# Patient Record
Sex: MALE | Race: WHITE | Employment: OTHER | URBAN - METROPOLITAN AREA
[De-identification: names, ages, dates, MRNs, and addresses within clinical notes are randomized per-mention and may not be internally consistent; named-entity substitution may affect disease eponyms.]

---

## 2017-01-23 PROBLEM — F10.90 HEAVY ALCOHOL USE: Status: ACTIVE | Noted: 2017-01-23

## 2017-01-23 PROBLEM — Z72.89 HEAVY ALCOHOL USE: Status: ACTIVE | Noted: 2017-01-23

## 2017-01-23 PROBLEM — Z78.9 HEAVY ALCOHOL USE: Status: ACTIVE | Noted: 2017-01-23

## 2017-05-26 PROBLEM — L84 CALLUS OF FOOT: Status: ACTIVE | Noted: 2017-05-26

## 2019-05-20 PROBLEM — N40.1 BENIGN NON-NODULAR PROSTATIC HYPERPLASIA WITH LOWER URINARY TRACT SYMPTOMS: Status: ACTIVE | Noted: 2019-05-20

## 2019-05-20 PROBLEM — K21.9 GASTROESOPHAGEAL REFLUX DISEASE, ESOPHAGITIS PRESENCE NOT SPECIFIED: Status: ACTIVE | Noted: 2019-05-20

## 2019-05-20 PROBLEM — Z87.891 PERSONAL HISTORY OF NICOTINE DEPENDENCE: Status: ACTIVE | Noted: 2019-05-20

## 2019-08-08 ENCOUNTER — APPOINTMENT (OUTPATIENT)
Dept: GENERAL RADIOLOGY | Facility: HOSPITAL | Age: 79
End: 2019-08-08
Attending: EMERGENCY MEDICINE
Payer: MEDICARE

## 2019-08-08 ENCOUNTER — HOSPITAL ENCOUNTER (EMERGENCY)
Facility: HOSPITAL | Age: 79
Discharge: HOME | End: 2019-08-08
Attending: EMERGENCY MEDICINE
Payer: MEDICARE

## 2019-08-08 VITALS
SYSTOLIC BLOOD PRESSURE: 129 MMHG | DIASTOLIC BLOOD PRESSURE: 88 MMHG | WEIGHT: 167 LBS | TEMPERATURE: 99 F | OXYGEN SATURATION: 94 % | RESPIRATION RATE: 12 BRPM | BODY MASS INDEX: 26 KG/M2 | HEART RATE: 77 BPM

## 2019-08-08 DIAGNOSIS — M25.512 ACUTE PAIN OF LEFT SHOULDER: Primary | ICD-10-CM

## 2019-08-08 LAB
ALBUMIN SERPL-MCNC: 3.2 G/DL (ref 3.4–5)
ALBUMIN/GLOB SERPL: 0.9 {RATIO} (ref 1–2)
ALP LIVER SERPL-CCNC: 91 U/L (ref 45–117)
ALT SERPL-CCNC: 18 U/L (ref 16–61)
ANION GAP SERPL CALC-SCNC: 8 MMOL/L (ref 0–18)
AST SERPL-CCNC: 21 U/L (ref 15–37)
BASOPHILS # BLD AUTO: 0.04 X10(3) UL (ref 0–0.2)
BASOPHILS NFR BLD AUTO: 0.9 %
BILIRUB SERPL-MCNC: 0.8 MG/DL (ref 0.1–2)
BUN BLD-MCNC: 12 MG/DL (ref 7–18)
BUN/CREAT SERPL: 12.8 (ref 10–20)
CALCIUM BLD-MCNC: 9 MG/DL (ref 8.5–10.1)
CHLORIDE SERPL-SCNC: 105 MMOL/L (ref 98–112)
CO2 SERPL-SCNC: 24 MMOL/L (ref 21–32)
CREAT BLD-MCNC: 0.94 MG/DL (ref 0.7–1.3)
DEPRECATED RDW RBC AUTO: 42.9 FL (ref 35.1–46.3)
EOSINOPHIL # BLD AUTO: 0.16 X10(3) UL (ref 0–0.7)
EOSINOPHIL NFR BLD AUTO: 3.4 %
ERYTHROCYTE [DISTWIDTH] IN BLOOD BY AUTOMATED COUNT: 12.5 % (ref 11–15)
GLOBULIN PLAS-MCNC: 3.4 G/DL (ref 2.8–4.4)
GLUCOSE BLD-MCNC: 159 MG/DL (ref 70–99)
HCT VFR BLD AUTO: 43.9 % (ref 39–53)
HGB BLD-MCNC: 15.5 G/DL (ref 13–17.5)
IMM GRANULOCYTES # BLD AUTO: 0.05 X10(3) UL (ref 0–1)
IMM GRANULOCYTES NFR BLD: 1.1 %
LYMPHOCYTES # BLD AUTO: 1.02 X10(3) UL (ref 1–4)
LYMPHOCYTES NFR BLD AUTO: 21.8 %
M PROTEIN MFR SERPL ELPH: 6.6 G/DL (ref 6.4–8.2)
MCH RBC QN AUTO: 33.5 PG (ref 26–34)
MCHC RBC AUTO-ENTMCNC: 35.3 G/DL (ref 31–37)
MCV RBC AUTO: 95 FL (ref 80–100)
MONOCYTES # BLD AUTO: 0.28 X10(3) UL (ref 0.1–1)
MONOCYTES NFR BLD AUTO: 6 %
NEUTROPHILS # BLD AUTO: 3.13 X10 (3) UL (ref 1.5–7.7)
NEUTROPHILS # BLD AUTO: 3.13 X10(3) UL (ref 1.5–7.7)
NEUTROPHILS NFR BLD AUTO: 66.8 %
OSMOLALITY SERPL CALC.SUM OF ELEC: 287 MOSM/KG (ref 275–295)
PLATELET # BLD AUTO: 187 10(3)UL (ref 150–450)
POTASSIUM SERPL-SCNC: 3.3 MMOL/L (ref 3.5–5.1)
RBC # BLD AUTO: 4.62 X10(6)UL (ref 3.8–5.8)
SODIUM SERPL-SCNC: 137 MMOL/L (ref 136–145)
TROPONIN I SERPL-MCNC: <0.045 NG/ML (ref ?–0.04)
WBC # BLD AUTO: 4.7 X10(3) UL (ref 4–11)

## 2019-08-08 PROCEDURE — 99285 EMERGENCY DEPT VISIT HI MDM: CPT

## 2019-08-08 PROCEDURE — 85025 COMPLETE CBC W/AUTO DIFF WBC: CPT | Performed by: EMERGENCY MEDICINE

## 2019-08-08 PROCEDURE — 80053 COMPREHEN METABOLIC PANEL: CPT | Performed by: EMERGENCY MEDICINE

## 2019-08-08 PROCEDURE — 93005 ELECTROCARDIOGRAM TRACING: CPT

## 2019-08-08 PROCEDURE — 73030 X-RAY EXAM OF SHOULDER: CPT | Performed by: EMERGENCY MEDICINE

## 2019-08-08 PROCEDURE — 99284 EMERGENCY DEPT VISIT MOD MDM: CPT

## 2019-08-08 PROCEDURE — 84484 ASSAY OF TROPONIN QUANT: CPT | Performed by: EMERGENCY MEDICINE

## 2019-08-08 PROCEDURE — 93010 ELECTROCARDIOGRAM REPORT: CPT

## 2019-08-08 PROCEDURE — 36415 COLL VENOUS BLD VENIPUNCTURE: CPT

## 2019-08-08 RX ORDER — IBUPROFEN 600 MG/1
600 TABLET ORAL ONCE
Status: COMPLETED | OUTPATIENT
Start: 2019-08-08 | End: 2019-08-08

## 2019-08-08 NOTE — ED PROVIDER NOTES
Patient Seen in: [facility] Emergency Department    History   Patient presents with:  Pain (neurologic)    Stated Complaint: pain in left shoulder arm    HPI    Patient is a 61-year-old male, presenting for evaluation of left shoulder pain.     Alicia noted above.     Physical Exam     ED Triage Vitals [08/08/19 0955]   /71   Pulse 97   Resp 21   Temp 98.8 °F (37.1 °C)   Temp src Temporal   SpO2 96 %   O2 Device None (Room air)       Current:/88   Pulse 77   Temp 98.8 °F (37.1 °C) (Temporal) these tests on the individual orders. RAINBOW DRAW BLUE   RAINBOW DRAW LAVENDER   RAINBOW DRAW LIGHT GREEN   RAINBOW DRAW GOLD   CBC W/ DIFFERENTIAL     EKG: The EKG revealed rate, intervals, and axis as noted on the EKG report.  I have reviewed and agree applied as desired. Additional evaluation and intervention may be required, depending on the patient's clinical course. Patient was invited to return for reevaluation of any problems, worsening symptoms, or as discussed.     Patient verbalizes [place]

## 2019-08-08 NOTE — ED INITIAL ASSESSMENT (HPI)
Per patient, left arm pain started 3 hours ago and is dull. Starts in left shoulder and radiates to elbow. Denies chest pain or shortness of breath. Denies cardiac history.  HTN history

## 2019-08-09 LAB
ATRIAL RATE: 88 BPM
P AXIS: 48 DEGREES
P-R INTERVAL: 182 MS
Q-T INTERVAL: 352 MS
QRS DURATION: 78 MS
QTC CALCULATION (BEZET): 425 MS
R AXIS: 1 DEGREES
T AXIS: 45 DEGREES
VENTRICULAR RATE: 88 BPM

## 2020-06-25 PROBLEM — I70.208: Status: ACTIVE | Noted: 2020-06-25

## 2020-06-25 PROBLEM — J43.9 MILD EMPHYSEMA (HCC): Status: ACTIVE | Noted: 2020-06-25

## 2020-06-28 PROBLEM — L84 CALLUS OF FOOT: Status: RESOLVED | Noted: 2017-05-26 | Resolved: 2020-06-28

## 2020-06-29 PROBLEM — N52.9 VASCULOGENIC ERECTILE DYSFUNCTION, UNSPECIFIED VASCULOGENIC ERECTILE DYSFUNCTION TYPE: Status: ACTIVE | Noted: 2020-06-29

## 2020-06-29 PROBLEM — R14.0 ABDOMINAL DISTENSION: Status: ACTIVE | Noted: 2020-06-29

## 2020-06-29 PROBLEM — G25.0 ESSENTIAL TREMOR: Status: ACTIVE | Noted: 2020-06-29

## 2021-01-28 PROBLEM — K21.9 GASTROESOPHAGEAL REFLUX DISEASE: Status: ACTIVE | Noted: 2019-05-20

## 2021-01-29 PROBLEM — J43.1 PANLOBULAR EMPHYSEMA (HCC): Status: ACTIVE | Noted: 2020-06-25

## 2021-03-19 ENCOUNTER — HOSPITAL ENCOUNTER (EMERGENCY)
Facility: HOSPITAL | Age: 81
Discharge: LEFT BEFORE DISCHARGE | End: 2021-03-19
Attending: EMERGENCY MEDICINE
Payer: MEDICARE

## 2021-03-19 VITALS
BODY MASS INDEX: 26.2 KG/M2 | HEART RATE: 76 BPM | WEIGHT: 163 LBS | RESPIRATION RATE: 20 BRPM | SYSTOLIC BLOOD PRESSURE: 166 MMHG | TEMPERATURE: 98 F | DIASTOLIC BLOOD PRESSURE: 96 MMHG | HEIGHT: 66 IN | OXYGEN SATURATION: 96 %

## 2021-03-19 DIAGNOSIS — H54.62 VISION LOSS OF LEFT EYE: Primary | ICD-10-CM

## 2021-03-19 LAB
ALBUMIN SERPL-MCNC: 3.6 G/DL (ref 3.4–5)
ALBUMIN/GLOB SERPL: 1.1 {RATIO} (ref 1–2)
ALP LIVER SERPL-CCNC: 83 U/L
ALT SERPL-CCNC: 21 U/L
ANION GAP SERPL CALC-SCNC: 4 MMOL/L (ref 0–18)
APTT PPP: 29.2 SECONDS (ref 25.4–36.1)
AST SERPL-CCNC: 13 U/L (ref 15–37)
BASOPHILS # BLD AUTO: 0.03 X10(3) UL (ref 0–0.2)
BASOPHILS NFR BLD AUTO: 0.4 %
BILIRUB SERPL-MCNC: 1.1 MG/DL (ref 0.1–2)
BUN BLD-MCNC: 21 MG/DL (ref 7–18)
BUN/CREAT SERPL: 20.6 (ref 10–20)
CALCIUM BLD-MCNC: 9.1 MG/DL (ref 8.5–10.1)
CHLORIDE SERPL-SCNC: 105 MMOL/L (ref 98–112)
CO2 SERPL-SCNC: 27 MMOL/L (ref 21–32)
CREAT BLD-MCNC: 1.02 MG/DL
CRP SERPL-MCNC: 1.2 MG/DL (ref ?–0.3)
DEPRECATED RDW RBC AUTO: 44.9 FL (ref 35.1–46.3)
EOSINOPHIL # BLD AUTO: 0.17 X10(3) UL (ref 0–0.7)
EOSINOPHIL NFR BLD AUTO: 2.4 %
ERYTHROCYTE [DISTWIDTH] IN BLOOD BY AUTOMATED COUNT: 12.5 % (ref 11–15)
GLOBULIN PLAS-MCNC: 3.3 G/DL (ref 2.8–4.4)
GLUCOSE BLD-MCNC: 96 MG/DL (ref 70–99)
HCT VFR BLD AUTO: 39.6 %
HGB BLD-MCNC: 13.7 G/DL
IMM GRANULOCYTES # BLD AUTO: 0.05 X10(3) UL (ref 0–1)
IMM GRANULOCYTES NFR BLD: 0.7 %
INR BLD: 0.99 (ref 0.89–1.11)
LYMPHOCYTES # BLD AUTO: 1.28 X10(3) UL (ref 1–4)
LYMPHOCYTES NFR BLD AUTO: 18 %
M PROTEIN MFR SERPL ELPH: 6.9 G/DL (ref 6.4–8.2)
MCH RBC QN AUTO: 33.5 PG (ref 26–34)
MCHC RBC AUTO-ENTMCNC: 34.6 G/DL (ref 31–37)
MCV RBC AUTO: 96.8 FL
MONOCYTES # BLD AUTO: 0.53 X10(3) UL (ref 0.1–1)
MONOCYTES NFR BLD AUTO: 7.5 %
NEUTROPHILS # BLD AUTO: 5.05 X10 (3) UL (ref 1.5–7.7)
NEUTROPHILS # BLD AUTO: 5.05 X10(3) UL (ref 1.5–7.7)
NEUTROPHILS NFR BLD AUTO: 71 %
OSMOLALITY SERPL CALC.SUM OF ELEC: 285 MOSM/KG (ref 275–295)
PLATELET # BLD AUTO: 214 10(3)UL (ref 150–450)
POTASSIUM SERPL-SCNC: 3.8 MMOL/L (ref 3.5–5.1)
PSA SERPL DL<=0.01 NG/ML-MCNC: 13.4 SECONDS (ref 12.4–14.6)
RBC # BLD AUTO: 4.09 X10(6)UL
SED RATE-ML: 20 MM/HR
SODIUM SERPL-SCNC: 136 MMOL/L (ref 136–145)
WBC # BLD AUTO: 7.1 X10(3) UL (ref 4–11)

## 2021-03-19 PROCEDURE — 80053 COMPREHEN METABOLIC PANEL: CPT | Performed by: EMERGENCY MEDICINE

## 2021-03-19 PROCEDURE — 85730 THROMBOPLASTIN TIME PARTIAL: CPT | Performed by: EMERGENCY MEDICINE

## 2021-03-19 PROCEDURE — 93010 ELECTROCARDIOGRAM REPORT: CPT

## 2021-03-19 PROCEDURE — 99283 EMERGENCY DEPT VISIT LOW MDM: CPT

## 2021-03-19 PROCEDURE — 86140 C-REACTIVE PROTEIN: CPT | Performed by: EMERGENCY MEDICINE

## 2021-03-19 PROCEDURE — 85610 PROTHROMBIN TIME: CPT | Performed by: EMERGENCY MEDICINE

## 2021-03-19 PROCEDURE — 99284 EMERGENCY DEPT VISIT MOD MDM: CPT

## 2021-03-19 PROCEDURE — 85025 COMPLETE CBC W/AUTO DIFF WBC: CPT | Performed by: EMERGENCY MEDICINE

## 2021-03-19 PROCEDURE — 85652 RBC SED RATE AUTOMATED: CPT | Performed by: EMERGENCY MEDICINE

## 2021-03-19 PROCEDURE — 36415 COLL VENOUS BLD VENIPUNCTURE: CPT

## 2021-03-19 PROCEDURE — 93005 ELECTROCARDIOGRAM TRACING: CPT
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
## 2021-03-21 LAB
ATRIAL RATE: 77 BPM
P AXIS: 46 DEGREES
P-R INTERVAL: 202 MS
Q-T INTERVAL: 370 MS
QRS DURATION: 78 MS
QTC CALCULATION (BEZET): 418 MS
R AXIS: -4 DEGREES
T AXIS: 36 DEGREES
VENTRICULAR RATE: 77 BPM

## 2021-04-07 PROBLEM — I65.23 ATHEROSCLEROSIS OF BOTH CAROTID ARTERIES: Status: ACTIVE | Noted: 2021-04-07

## 2021-04-07 PROBLEM — H53.122 TRANSIENT VISUAL LOSS OF LEFT EYE: Status: ACTIVE | Noted: 2021-04-07

## 2021-05-10 PROBLEM — G31.9 CEREBELLAR ATROPHY (HCC): Status: ACTIVE | Noted: 2021-05-10

## 2021-05-10 PROBLEM — G31.9 CEREBRAL ATROPHY (HCC): Status: ACTIVE | Noted: 2021-05-10

## 2021-07-26 PROBLEM — D69.2 PURPURA (HCC): Status: ACTIVE | Noted: 2021-07-26

## 2021-07-26 PROBLEM — G31.9 BRAIN ATROPHY (HCC): Status: ACTIVE | Noted: 2021-07-26

## 2021-07-28 PROBLEM — R14.0 ABDOMINAL DISTENSION: Status: RESOLVED | Noted: 2020-06-29 | Resolved: 2021-07-28

## 2022-01-01 ENCOUNTER — NURSE ONLY (OUTPATIENT)
Dept: LAB | Facility: HOSPITAL | Age: 82
End: 2022-01-01
Attending: FAMILY MEDICINE
Payer: MEDICARE

## 2022-01-01 DIAGNOSIS — J94.8 PLEURAL MASS: ICD-10-CM

## 2022-01-01 LAB
ERYTHROCYTE [DISTWIDTH] IN BLOOD BY AUTOMATED COUNT: 15.1 %
HCT VFR BLD AUTO: 31.2 %
HGB BLD-MCNC: 9.4 G/DL
INR BLD: 1.08 (ref 0.85–1.16)
MCH RBC QN AUTO: 26.4 PG (ref 26–34)
MCHC RBC AUTO-ENTMCNC: 30.1 G/DL (ref 31–37)
MCV RBC AUTO: 87.6 FL
PLATELET # BLD AUTO: 193 10(3)UL (ref 150–450)
PROTHROMBIN TIME: 14 SECONDS (ref 11.6–14.8)
RBC # BLD AUTO: 3.56 X10(6)UL
WBC # BLD AUTO: 5.6 X10(3) UL (ref 4–11)

## 2022-01-01 PROCEDURE — 85027 COMPLETE CBC AUTOMATED: CPT

## 2022-01-01 PROCEDURE — 36415 COLL VENOUS BLD VENIPUNCTURE: CPT

## 2022-01-01 PROCEDURE — 85610 PROTHROMBIN TIME: CPT

## 2022-01-01 PROCEDURE — 88360 TUMOR IMMUNOHISTOCHEM/MANUAL: CPT

## 2022-06-29 ENCOUNTER — APPOINTMENT (OUTPATIENT)
Dept: CT IMAGING | Facility: HOSPITAL | Age: 82
End: 2022-06-29
Attending: EMERGENCY MEDICINE
Payer: MEDICARE

## 2022-06-29 ENCOUNTER — HOSPITAL ENCOUNTER (OUTPATIENT)
Facility: HOSPITAL | Age: 82
Setting detail: OBSERVATION
Discharge: HOME | End: 2022-06-30
Attending: EMERGENCY MEDICINE | Admitting: INTERNAL MEDICINE
Payer: MEDICARE

## 2022-06-29 ENCOUNTER — APPOINTMENT (OUTPATIENT)
Dept: GENERAL RADIOLOGY | Facility: HOSPITAL | Age: 82
End: 2022-06-29
Attending: EMERGENCY MEDICINE
Payer: MEDICARE

## 2022-06-29 DIAGNOSIS — R55 SYNCOPE, NEAR: Primary | ICD-10-CM

## 2022-06-29 DIAGNOSIS — R79.89 ELEVATED D-DIMER: ICD-10-CM

## 2022-06-29 DIAGNOSIS — I95.1 ORTHOSTASIS: ICD-10-CM

## 2022-06-29 DIAGNOSIS — E87.6 HYPOKALEMIA: ICD-10-CM

## 2022-06-29 LAB
ALBUMIN SERPL-MCNC: 3.1 G/DL (ref 3.4–5)
ALBUMIN/GLOB SERPL: 0.9 {RATIO} (ref 1–2)
ALP LIVER SERPL-CCNC: 117 U/L
ALT SERPL-CCNC: 24 U/L
ANION GAP SERPL CALC-SCNC: 10 MMOL/L (ref 0–18)
AST SERPL-CCNC: 17 U/L (ref 15–37)
BASOPHILS # BLD AUTO: 0.02 X10(3) UL (ref 0–0.2)
BASOPHILS NFR BLD AUTO: 0.4 %
BILIRUB SERPL-MCNC: 1.1 MG/DL (ref 0.1–2)
BUN BLD-MCNC: 25 MG/DL (ref 7–18)
CALCIUM BLD-MCNC: 9.1 MG/DL (ref 8.5–10.1)
CHLORIDE SERPL-SCNC: 100 MMOL/L (ref 98–112)
CO2 SERPL-SCNC: 26 MMOL/L (ref 21–32)
CREAT BLD-MCNC: 1.07 MG/DL
D DIMER PPP FEU-MCNC: 1.84 UG/ML FEU (ref ?–0.79)
EOSINOPHIL # BLD AUTO: 0.08 X10(3) UL (ref 0–0.7)
EOSINOPHIL NFR BLD AUTO: 1.7 %
ERYTHROCYTE [DISTWIDTH] IN BLOOD BY AUTOMATED COUNT: 12.8 %
GLOBULIN PLAS-MCNC: 3.4 G/DL (ref 2.8–4.4)
GLUCOSE BLD-MCNC: 96 MG/DL (ref 70–99)
HCT VFR BLD AUTO: 35.9 %
HGB BLD-MCNC: 12.6 G/DL
IMM GRANULOCYTES # BLD AUTO: 0.02 X10(3) UL (ref 0–1)
IMM GRANULOCYTES NFR BLD: 0.4 %
LYMPHOCYTES # BLD AUTO: 1.12 X10(3) UL (ref 1–4)
LYMPHOCYTES NFR BLD AUTO: 24.4 %
MAGNESIUM SERPL-MCNC: 1.7 MG/DL (ref 1.6–2.6)
MCH RBC QN AUTO: 32.5 PG (ref 26–34)
MCHC RBC AUTO-ENTMCNC: 35.1 G/DL (ref 31–37)
MCV RBC AUTO: 92.5 FL
MONOCYTES # BLD AUTO: 0.52 X10(3) UL (ref 0.1–1)
MONOCYTES NFR BLD AUTO: 11.3 %
NEUTROPHILS # BLD AUTO: 2.83 X10 (3) UL (ref 1.5–7.7)
NEUTROPHILS # BLD AUTO: 2.83 X10(3) UL (ref 1.5–7.7)
NEUTROPHILS NFR BLD AUTO: 61.8 %
NT-PROBNP SERPL-MCNC: 80 PG/ML (ref ?–450)
OSMOLALITY SERPL CALC.SUM OF ELEC: 286 MOSM/KG (ref 275–295)
PLATELET # BLD AUTO: 174 10(3)UL (ref 150–450)
POTASSIUM SERPL-SCNC: 3 MMOL/L (ref 3.5–5.1)
PROT SERPL-MCNC: 6.5 G/DL (ref 6.4–8.2)
RBC # BLD AUTO: 3.88 X10(6)UL
SARS-COV-2 RNA RESP QL NAA+PROBE: NOT DETECTED
SODIUM SERPL-SCNC: 136 MMOL/L (ref 136–145)
TROPONIN I HIGH SENSITIVITY: 10 NG/L
TSI SER-ACNC: 2.72 MIU/ML (ref 0.36–3.74)
WBC # BLD AUTO: 4.6 X10(3) UL (ref 4–11)

## 2022-06-29 PROCEDURE — 96360 HYDRATION IV INFUSION INIT: CPT

## 2022-06-29 PROCEDURE — 84484 ASSAY OF TROPONIN QUANT: CPT | Performed by: EMERGENCY MEDICINE

## 2022-06-29 PROCEDURE — 93010 ELECTROCARDIOGRAM REPORT: CPT

## 2022-06-29 PROCEDURE — 99285 EMERGENCY DEPT VISIT HI MDM: CPT

## 2022-06-29 PROCEDURE — 96361 HYDRATE IV INFUSION ADD-ON: CPT

## 2022-06-29 PROCEDURE — 71045 X-RAY EXAM CHEST 1 VIEW: CPT | Performed by: EMERGENCY MEDICINE

## 2022-06-29 PROCEDURE — 71275 CT ANGIOGRAPHY CHEST: CPT | Performed by: EMERGENCY MEDICINE

## 2022-06-29 PROCEDURE — 80053 COMPREHEN METABOLIC PANEL: CPT | Performed by: EMERGENCY MEDICINE

## 2022-06-29 PROCEDURE — 93005 ELECTROCARDIOGRAM TRACING: CPT

## 2022-06-29 PROCEDURE — 70450 CT HEAD/BRAIN W/O DYE: CPT | Performed by: EMERGENCY MEDICINE

## 2022-06-29 PROCEDURE — 83880 ASSAY OF NATRIURETIC PEPTIDE: CPT | Performed by: EMERGENCY MEDICINE

## 2022-06-29 PROCEDURE — 83735 ASSAY OF MAGNESIUM: CPT | Performed by: EMERGENCY MEDICINE

## 2022-06-29 PROCEDURE — 84443 ASSAY THYROID STIM HORMONE: CPT | Performed by: EMERGENCY MEDICINE

## 2022-06-29 PROCEDURE — 85025 COMPLETE CBC W/AUTO DIFF WBC: CPT | Performed by: EMERGENCY MEDICINE

## 2022-06-29 PROCEDURE — 85379 FIBRIN DEGRADATION QUANT: CPT | Performed by: EMERGENCY MEDICINE

## 2022-06-29 RX ORDER — IOHEXOL 350 MG/ML
100 INJECTION, SOLUTION INTRAVENOUS
Status: COMPLETED | OUTPATIENT
Start: 2022-06-29 | End: 2022-06-29

## 2022-06-29 RX ORDER — POTASSIUM CHLORIDE 1.5 G/1.77G
40 POWDER, FOR SOLUTION ORAL ONCE
Status: COMPLETED | OUTPATIENT
Start: 2022-06-29 | End: 2022-06-29

## 2022-06-30 VITALS
OXYGEN SATURATION: 97 % | DIASTOLIC BLOOD PRESSURE: 56 MMHG | SYSTOLIC BLOOD PRESSURE: 128 MMHG | HEART RATE: 101 BPM | WEIGHT: 160.94 LBS | BODY MASS INDEX: 26 KG/M2 | TEMPERATURE: 98 F | RESPIRATION RATE: 18 BRPM

## 2022-06-30 PROBLEM — R79.89 ELEVATED D-DIMER: Status: ACTIVE | Noted: 2022-01-01

## 2022-06-30 PROBLEM — I95.1 ORTHOSTASIS: Status: ACTIVE | Noted: 2022-06-30

## 2022-06-30 PROBLEM — E87.6 HYPOKALEMIA: Status: ACTIVE | Noted: 2022-06-30

## 2022-06-30 PROBLEM — E87.6 HYPOKALEMIA: Status: ACTIVE | Noted: 2022-01-01

## 2022-06-30 PROBLEM — I95.1 ORTHOSTASIS: Status: ACTIVE | Noted: 2022-01-01

## 2022-06-30 PROBLEM — R79.89 ELEVATED D-DIMER: Status: ACTIVE | Noted: 2022-06-30

## 2022-06-30 LAB
ATRIAL RATE: 98 BPM
BILIRUB UR QL STRIP.AUTO: NEGATIVE
CLARITY UR REFRACT.AUTO: CLEAR
GLUCOSE UR STRIP.AUTO-MCNC: NEGATIVE MG/DL
KETONES UR STRIP.AUTO-MCNC: NEGATIVE MG/DL
NITRITE UR QL STRIP.AUTO: NEGATIVE
PH UR STRIP.AUTO: 7 [PH] (ref 5–8)
PROT UR STRIP.AUTO-MCNC: NEGATIVE MG/DL
Q-T INTERVAL: 346 MS
QRS DURATION: 84 MS
QTC CALCULATION (BEZET): 430 MS
R AXIS: -18 DEGREES
RBC UR QL AUTO: NEGATIVE
SP GR UR STRIP.AUTO: >1.03 (ref 1–1.03)
T AXIS: 41 DEGREES
UROBILINOGEN UR STRIP.AUTO-MCNC: 2 MG/DL
VENTRICULAR RATE: 93 BPM

## 2022-06-30 PROCEDURE — 87086 URINE CULTURE/COLONY COUNT: CPT | Performed by: EMERGENCY MEDICINE

## 2022-06-30 PROCEDURE — 96372 THER/PROPH/DIAG INJ SC/IM: CPT

## 2022-06-30 PROCEDURE — 81001 URINALYSIS AUTO W/SCOPE: CPT | Performed by: EMERGENCY MEDICINE

## 2022-06-30 RX ORDER — AMITRIPTYLINE HYDROCHLORIDE 10 MG/1
10 TABLET, FILM COATED ORAL NIGHTLY PRN
Status: DISCONTINUED | OUTPATIENT
Start: 2022-06-30 | End: 2022-06-30

## 2022-06-30 RX ORDER — ACETAMINOPHEN 500 MG
500 TABLET ORAL EVERY 4 HOURS PRN
Status: DISCONTINUED | OUTPATIENT
Start: 2022-06-30 | End: 2022-06-30

## 2022-06-30 RX ORDER — CLOPIDOGREL BISULFATE 75 MG/1
75 TABLET ORAL DAILY
Status: DISCONTINUED | OUTPATIENT
Start: 2022-06-30 | End: 2022-06-30

## 2022-06-30 RX ORDER — HEPARIN SODIUM 5000 [USP'U]/ML
5000 INJECTION, SOLUTION INTRAVENOUS; SUBCUTANEOUS EVERY 8 HOURS SCHEDULED
Status: DISCONTINUED | OUTPATIENT
Start: 2022-06-30 | End: 2022-06-30

## 2022-06-30 RX ORDER — FINASTERIDE 5 MG/1
5 TABLET, FILM COATED ORAL DAILY
Status: DISCONTINUED | OUTPATIENT
Start: 2022-06-30 | End: 2022-06-30

## 2022-06-30 RX ORDER — VALSARTAN AND HYDROCHLOROTHIAZIDE 320; 25 MG/1; MG/1
1 TABLET, FILM COATED ORAL DAILY
Status: DISCONTINUED | OUTPATIENT
Start: 2022-06-30 | End: 2022-06-30 | Stop reason: SDUPTHER

## 2022-06-30 RX ORDER — AMITRIPTYLINE HYDROCHLORIDE 10 MG/1
TABLET, FILM COATED ORAL NIGHTLY PRN
COMMUNITY
Start: 2022-06-13

## 2022-06-30 RX ORDER — TAMSULOSIN HYDROCHLORIDE 0.4 MG/1
0.4 CAPSULE ORAL
Status: DISCONTINUED | OUTPATIENT
Start: 2022-06-30 | End: 2022-06-30

## 2022-06-30 RX ORDER — AMLODIPINE BESYLATE 5 MG/1
5 TABLET ORAL DAILY
Qty: 30 TABLET | Refills: 0 | Status: SHIPPED | OUTPATIENT
Start: 2022-07-01

## 2022-06-30 RX ORDER — AMLODIPINE BESYLATE 5 MG/1
5 TABLET ORAL DAILY
Status: DISCONTINUED | OUTPATIENT
Start: 2022-06-30 | End: 2022-06-30

## 2022-06-30 RX ORDER — ROSUVASTATIN CALCIUM 10 MG/1
10 TABLET, COATED ORAL NIGHTLY
Status: DISCONTINUED | OUTPATIENT
Start: 2022-06-30 | End: 2022-06-30

## 2022-06-30 RX ORDER — PANTOPRAZOLE SODIUM 40 MG/1
40 TABLET, DELAYED RELEASE ORAL
Status: DISCONTINUED | OUTPATIENT
Start: 2022-06-30 | End: 2022-06-30

## 2022-06-30 NOTE — PLAN OF CARE
Assumed care at around 0100  A&Ox4, RA, NSR on tele  Denying pain   Call light within reach    Patient updated on plan of care

## 2022-06-30 NOTE — ED QUICK NOTES
Orders for admission, patient is aware of plan and ready to go upstairs. Any questions, please call ED RN Galilea Laguans  at extension [phone]. Vaccinated?  Yes  Type of COVID test sent: Rapid PCR  COVID Suspicion level: Low      Titratable drug(s) infusing:N/A  Rate:    LOC at time of transport:AXOX4    Other pertinent information:    CIWA score=n/a  NIH score=n/a

## 2022-06-30 NOTE — ED INITIAL ASSESSMENT (HPI)
Pt reports feeling dizzy and fell twice today. Pt denies any LOC or injuries. Pt has a hx of arrhythmias and was recently placed on a heart monitor for 7 days.

## 2022-06-30 NOTE — PROGRESS NOTES
06/30/22 0120 06/30/22 0123 06/30/22 0126   Vital Signs   /76 132/70 132/64   MAP (mmHg) 92 85 81   BP Location Right arm Right arm Right arm   BP Method Automatic Automatic Automatic   Patient Position Lying Sitting Standing

## 2022-06-30 NOTE — PLAN OF CARE
Assumed care at 0700  Patient alert, oriented x4  Orthostatics completed  Ambulating in halls without difficulty  Denies dizziness, sob  Consults cleared for discharge    AVS reviewed with patient and spouse. All questions answered. NURSING DISCHARGE NOTE    Discharged Home via Wheelchair. Accompanied by Spouse  Belongings Taken by patient/family.

## 2022-06-30 NOTE — PROGRESS NOTES
06/30/22 1235 06/30/22 1237 06/30/22 1239   Vital Signs   /59 122/62 128/56   MAP (mmHg) 77 76 73   BP Location Right arm Right arm Right arm   BP Method Automatic Automatic Automatic   Patient Position Lying Sitting Standing

## 2022-09-01 ENCOUNTER — APPOINTMENT (OUTPATIENT)
Dept: GENERAL RADIOLOGY | Facility: HOSPITAL | Age: 82
DRG: 853 | End: 2022-09-01
Payer: MEDICARE

## 2022-09-01 ENCOUNTER — APPOINTMENT (OUTPATIENT)
Dept: CT IMAGING | Facility: HOSPITAL | Age: 82
DRG: 853 | End: 2022-09-01
Attending: EMERGENCY MEDICINE
Payer: MEDICARE

## 2022-09-01 ENCOUNTER — APPOINTMENT (OUTPATIENT)
Dept: GENERAL RADIOLOGY | Facility: HOSPITAL | Age: 82
DRG: 853 | End: 2022-09-01
Attending: EMERGENCY MEDICINE
Payer: MEDICARE

## 2022-09-01 ENCOUNTER — HOSPITAL ENCOUNTER (INPATIENT)
Facility: HOSPITAL | Age: 82
LOS: 27 days | Discharge: NURSING, REHAB OR LONG-TERM CARE | DRG: 853 | End: 2022-09-30
Attending: EMERGENCY MEDICINE | Admitting: INTERNAL MEDICINE
Payer: MEDICARE

## 2022-09-01 ENCOUNTER — APPOINTMENT (OUTPATIENT)
Dept: MRI IMAGING | Facility: HOSPITAL | Age: 82
DRG: 853 | End: 2022-09-01
Attending: EMERGENCY MEDICINE
Payer: MEDICARE

## 2022-09-01 DIAGNOSIS — E87.6 HYPOKALEMIA: Primary | ICD-10-CM

## 2022-09-01 DIAGNOSIS — R53.1 WEAKNESS GENERALIZED: ICD-10-CM

## 2022-09-01 DIAGNOSIS — R29.6 FREQUENT FALLS: ICD-10-CM

## 2022-09-01 DIAGNOSIS — S32.000A COMPRESSION FRACTURE OF LUMBAR VERTEBRA, UNSPECIFIED LUMBAR VERTEBRAL LEVEL, INITIAL ENCOUNTER (HCC): ICD-10-CM

## 2022-09-01 DIAGNOSIS — U07.1 COVID-19 VIRUS INFECTION: ICD-10-CM

## 2022-09-01 LAB
ALBUMIN SERPL-MCNC: 2.9 G/DL (ref 3.4–5)
ALBUMIN/GLOB SERPL: 0.7 {RATIO} (ref 1–2)
ALP LIVER SERPL-CCNC: 114 U/L
ALT SERPL-CCNC: 23 U/L
ANION GAP SERPL CALC-SCNC: 8 MMOL/L (ref 0–18)
APTT PPP: 29.5 SECONDS (ref 23.3–35.6)
AST SERPL-CCNC: 25 U/L (ref 15–37)
BASOPHILS # BLD AUTO: 0.01 X10(3) UL (ref 0–0.2)
BASOPHILS NFR BLD AUTO: 0.2 %
BILIRUB SERPL-MCNC: 1.8 MG/DL (ref 0.1–2)
BILIRUB UR QL STRIP.AUTO: NEGATIVE
BUN BLD-MCNC: 14 MG/DL (ref 7–18)
CALCIUM BLD-MCNC: 9.3 MG/DL (ref 8.5–10.1)
CHLORIDE SERPL-SCNC: 100 MMOL/L (ref 98–112)
CLARITY UR REFRACT.AUTO: CLEAR
CO2 SERPL-SCNC: 28 MMOL/L (ref 21–32)
COLOR UR AUTO: YELLOW
CREAT BLD-MCNC: 0.9 MG/DL
D DIMER PPP FEU-MCNC: 1.66 UG/ML FEU (ref ?–0.79)
EOSINOPHIL # BLD AUTO: 0.01 X10(3) UL (ref 0–0.7)
EOSINOPHIL NFR BLD AUTO: 0.2 %
ERYTHROCYTE [DISTWIDTH] IN BLOOD BY AUTOMATED COUNT: 14.7 %
GFR SERPLBLD BASED ON 1.73 SQ M-ARVRAT: 87 ML/MIN/1.73M2 (ref 60–?)
GLOBULIN PLAS-MCNC: 4 G/DL (ref 2.8–4.4)
GLUCOSE BLD-MCNC: 96 MG/DL (ref 70–99)
GLUCOSE UR STRIP.AUTO-MCNC: NEGATIVE MG/DL
HCT VFR BLD AUTO: 38.6 %
HGB BLD-MCNC: 12.8 G/DL
IMM GRANULOCYTES # BLD AUTO: 0.03 X10(3) UL (ref 0–1)
IMM GRANULOCYTES NFR BLD: 0.6 %
INR BLD: 1.08 (ref 0.85–1.16)
KETONES UR STRIP.AUTO-MCNC: NEGATIVE MG/DL
LYMPHOCYTES # BLD AUTO: 0.57 X10(3) UL (ref 1–4)
LYMPHOCYTES NFR BLD AUTO: 10.6 %
MCH RBC QN AUTO: 32.8 PG (ref 26–34)
MCHC RBC AUTO-ENTMCNC: 33.2 G/DL (ref 31–37)
MCV RBC AUTO: 99 FL
MONOCYTES # BLD AUTO: 0.81 X10(3) UL (ref 0.1–1)
MONOCYTES NFR BLD AUTO: 15.1 %
NEUTROPHILS # BLD AUTO: 3.95 X10 (3) UL (ref 1.5–7.7)
NEUTROPHILS # BLD AUTO: 3.95 X10(3) UL (ref 1.5–7.7)
NEUTROPHILS NFR BLD AUTO: 73.3 %
NITRITE UR QL STRIP.AUTO: NEGATIVE
NT-PROBNP SERPL-MCNC: 911 PG/ML (ref ?–450)
OSMOLALITY SERPL CALC.SUM OF ELEC: 282 MOSM/KG (ref 275–295)
PH UR STRIP.AUTO: 7 [PH] (ref 5–8)
PLATELET # BLD AUTO: 238 10(3)UL (ref 150–450)
POTASSIUM SERPL-SCNC: 2.9 MMOL/L (ref 3.5–5.1)
PROT SERPL-MCNC: 6.9 G/DL (ref 6.4–8.2)
PROT UR STRIP.AUTO-MCNC: NEGATIVE MG/DL
PROTHROMBIN TIME: 14 SECONDS (ref 11.6–14.8)
RBC # BLD AUTO: 3.9 X10(6)UL
RBC UR QL AUTO: NEGATIVE
SARS-COV-2 RNA RESP QL NAA+PROBE: DETECTED
SODIUM SERPL-SCNC: 136 MMOL/L (ref 136–145)
SP GR UR STRIP.AUTO: 1.01 (ref 1–1.03)
TROPONIN I HIGH SENSITIVITY: 21 NG/L
UROBILINOGEN UR STRIP.AUTO-MCNC: 4 MG/DL
WBC # BLD AUTO: 5.4 X10(3) UL (ref 4–11)

## 2022-09-01 PROCEDURE — 70450 CT HEAD/BRAIN W/O DYE: CPT | Performed by: EMERGENCY MEDICINE

## 2022-09-01 PROCEDURE — 72148 MRI LUMBAR SPINE W/O DYE: CPT | Performed by: EMERGENCY MEDICINE

## 2022-09-01 PROCEDURE — 71045 X-RAY EXAM CHEST 1 VIEW: CPT | Performed by: EMERGENCY MEDICINE

## 2022-09-01 PROCEDURE — 71260 CT THORAX DX C+: CPT | Performed by: EMERGENCY MEDICINE

## 2022-09-01 RX ORDER — TAMSULOSIN HYDROCHLORIDE 0.4 MG/1
0.4 CAPSULE ORAL DAILY
COMMUNITY

## 2022-09-01 RX ORDER — ROSUVASTATIN CALCIUM 10 MG/1
10 TABLET, COATED ORAL NIGHTLY
COMMUNITY
End: 2022-09-30

## 2022-09-01 RX ORDER — ONDANSETRON 2 MG/ML
4 INJECTION INTRAMUSCULAR; INTRAVENOUS EVERY 6 HOURS PRN
Status: DISCONTINUED | OUTPATIENT
Start: 2022-09-01 | End: 2022-09-06

## 2022-09-01 RX ORDER — POTASSIUM CHLORIDE 20 MEQ/1
40 TABLET, EXTENDED RELEASE ORAL ONCE
Status: COMPLETED | OUTPATIENT
Start: 2022-09-01 | End: 2022-09-01

## 2022-09-01 RX ORDER — LORAZEPAM 1 MG/1
0.5 TABLET ORAL ONCE
Status: COMPLETED | OUTPATIENT
Start: 2022-09-01 | End: 2022-09-01

## 2022-09-01 RX ORDER — ENOXAPARIN SODIUM 100 MG/ML
40 INJECTION SUBCUTANEOUS DAILY
Status: DISCONTINUED | OUTPATIENT
Start: 2022-09-02 | End: 2022-09-19 | Stop reason: SDUPTHER

## 2022-09-01 RX ORDER — METOCLOPRAMIDE HYDROCHLORIDE 5 MG/ML
10 INJECTION INTRAMUSCULAR; INTRAVENOUS EVERY 8 HOURS PRN
Status: DISCONTINUED | OUTPATIENT
Start: 2022-09-01 | End: 2022-09-30

## 2022-09-01 RX ORDER — AMITRIPTYLINE HYDROCHLORIDE 10 MG/1
10 TABLET, FILM COATED ORAL NIGHTLY PRN
Status: DISCONTINUED | OUTPATIENT
Start: 2022-09-01 | End: 2022-09-30

## 2022-09-01 RX ORDER — POLYETHYLENE GLYCOL 3350 17 G/17G
17 POWDER, FOR SOLUTION ORAL DAILY PRN
Status: DISCONTINUED | OUTPATIENT
Start: 2022-09-01 | End: 2022-09-14

## 2022-09-01 RX ORDER — ASPIRIN 81 MG/1
81 TABLET ORAL AS NEEDED
COMMUNITY
End: 2022-09-30

## 2022-09-01 RX ORDER — BISACODYL 10 MG
10 SUPPOSITORY, RECTAL RECTAL
Status: DISCONTINUED | OUTPATIENT
Start: 2022-09-01 | End: 2022-09-14

## 2022-09-01 RX ORDER — FINASTERIDE 5 MG/1
5 TABLET, FILM COATED ORAL DAILY
Status: DISCONTINUED | OUTPATIENT
Start: 2022-09-02 | End: 2022-09-21 | Stop reason: SDUPTHER

## 2022-09-01 RX ORDER — CLOPIDOGREL BISULFATE 75 MG/1
75 TABLET ORAL DAILY
Status: DISCONTINUED | OUTPATIENT
Start: 2022-09-02 | End: 2022-09-14 | Stop reason: SDUPTHER

## 2022-09-01 RX ORDER — PANTOPRAZOLE SODIUM 40 MG/1
40 TABLET, DELAYED RELEASE ORAL
Status: DISCONTINUED | OUTPATIENT
Start: 2022-09-02 | End: 2022-09-03

## 2022-09-01 RX ORDER — CLOPIDOGREL BISULFATE 75 MG/1
75 TABLET ORAL DAILY
COMMUNITY

## 2022-09-01 RX ORDER — ACETAMINOPHEN 500 MG
500 TABLET ORAL EVERY 4 HOURS PRN
Status: DISCONTINUED | OUTPATIENT
Start: 2022-09-01 | End: 2022-09-30

## 2022-09-01 RX ORDER — SENNOSIDES 8.6 MG
17.2 TABLET ORAL NIGHTLY PRN
Status: DISCONTINUED | OUTPATIENT
Start: 2022-09-01 | End: 2022-09-14

## 2022-09-01 RX ORDER — TAMSULOSIN HYDROCHLORIDE 0.4 MG/1
0.4 CAPSULE ORAL DAILY
Status: DISCONTINUED | OUTPATIENT
Start: 2022-09-02 | End: 2022-09-30

## 2022-09-01 RX ORDER — IOHEXOL 350 MG/ML
90 INJECTION, SOLUTION INTRAVENOUS
Status: COMPLETED | OUTPATIENT
Start: 2022-09-01 | End: 2022-09-01

## 2022-09-01 RX ORDER — FINASTERIDE 5 MG/1
5 TABLET, FILM COATED ORAL DAILY
COMMUNITY

## 2022-09-01 RX ORDER — ROSUVASTATIN CALCIUM 10 MG/1
10 TABLET, COATED ORAL NIGHTLY
Status: DISCONTINUED | OUTPATIENT
Start: 2022-09-01 | End: 2022-09-09

## 2022-09-01 RX ORDER — VALSARTAN AND HYDROCHLOROTHIAZIDE 320; 25 MG/1; MG/1
1 TABLET, FILM COATED ORAL DAILY
COMMUNITY
End: 2022-09-01 | Stop reason: CLARIF

## 2022-09-01 RX ORDER — SODIUM PHOSPHATE, DIBASIC AND SODIUM PHOSPHATE, MONOBASIC 7; 19 G/133ML; G/133ML
1 ENEMA RECTAL ONCE AS NEEDED
Status: DISCONTINUED | OUTPATIENT
Start: 2022-09-01 | End: 2022-09-30

## 2022-09-01 NOTE — ED QUICK NOTES
Orders for admission, patient is aware of plan and ready to go upstairs. Any questions, please call ED KENTRELL Connellly at extension [phone]     Patient Covid vaccination status: Fully vaccinated     COVID Test Ordered in ED: Rapid SARS-CoV-2 by PCR    COVID Suspicion at Admission: N/A    Running Infusions:      Mental Status/LOC at time of transport: alert and oriented x4, but intermittent moments of confusion with re-explanation needed. Pt is on external nichols catheter at this time. Increase in falls in the last several weeks. Starting using a walker yesterday.      Other pertinent information:   CIWA score: N/A   NIH score:  N/A

## 2022-09-01 NOTE — ED INITIAL ASSESSMENT (HPI)
Pt c/o multiple falls over the last few weeks, last fall was this AM. Pt denies hitting his head, denies LOC. Pt also c/o increased leg weakness, difficulty with balance, severe lower back pain. +thinners.

## 2022-09-02 LAB
ALBUMIN SERPL-MCNC: 2.5 G/DL (ref 3.4–5)
ALBUMIN/GLOB SERPL: 0.7 {RATIO} (ref 1–2)
ALP LIVER SERPL-CCNC: 94 U/L
ALT SERPL-CCNC: 19 U/L
ANION GAP SERPL CALC-SCNC: 9 MMOL/L (ref 0–18)
AST SERPL-CCNC: 24 U/L (ref 15–37)
ATRIAL RATE: 102 BPM
ATRIAL RATE: 106 BPM
BASOPHILS # BLD AUTO: 0.01 X10(3) UL (ref 0–0.2)
BASOPHILS NFR BLD AUTO: 0.2 %
BILIRUB SERPL-MCNC: 1.6 MG/DL (ref 0.1–2)
BUN BLD-MCNC: 9 MG/DL (ref 7–18)
CALCIUM BLD-MCNC: 8.5 MG/DL (ref 8.5–10.1)
CHLORIDE SERPL-SCNC: 101 MMOL/L (ref 98–112)
CO2 SERPL-SCNC: 24 MMOL/L (ref 21–32)
CREAT BLD-MCNC: 0.64 MG/DL
EOSINOPHIL # BLD AUTO: 0 X10(3) UL (ref 0–0.7)
EOSINOPHIL NFR BLD AUTO: 0 %
ERYTHROCYTE [DISTWIDTH] IN BLOOD BY AUTOMATED COUNT: 14.6 %
GFR SERPLBLD BASED ON 1.73 SQ M-ARVRAT: 96 ML/MIN/1.73M2 (ref 60–?)
GLOBULIN PLAS-MCNC: 3.6 G/DL (ref 2.8–4.4)
GLUCOSE BLD-MCNC: 116 MG/DL (ref 70–99)
GLUCOSE BLD-MCNC: 164 MG/DL (ref 70–99)
HCT VFR BLD AUTO: 37.5 %
HGB BLD-MCNC: 12.6 G/DL
IMM GRANULOCYTES # BLD AUTO: 0.03 X10(3) UL (ref 0–1)
IMM GRANULOCYTES NFR BLD: 0.7 %
LYMPHOCYTES # BLD AUTO: 0.6 X10(3) UL (ref 1–4)
LYMPHOCYTES NFR BLD AUTO: 13.1 %
MAGNESIUM SERPL-MCNC: 1.6 MG/DL (ref 1.6–2.6)
MCH RBC QN AUTO: 33.7 PG (ref 26–34)
MCHC RBC AUTO-ENTMCNC: 33.6 G/DL (ref 31–37)
MCV RBC AUTO: 100.3 FL
MONOCYTES # BLD AUTO: 0.52 X10(3) UL (ref 0.1–1)
MONOCYTES NFR BLD AUTO: 11.4 %
NEUTROPHILS # BLD AUTO: 3.42 X10 (3) UL (ref 1.5–7.7)
NEUTROPHILS # BLD AUTO: 3.42 X10(3) UL (ref 1.5–7.7)
NEUTROPHILS NFR BLD AUTO: 74.6 %
OSMOLALITY SERPL CALC.SUM OF ELEC: 278 MOSM/KG (ref 275–295)
P AXIS: 86 DEGREES
P-R INTERVAL: 198 MS
PLATELET # BLD AUTO: 188 10(3)UL (ref 150–450)
POTASSIUM SERPL-SCNC: 3.1 MMOL/L (ref 3.5–5.1)
POTASSIUM SERPL-SCNC: 4 MMOL/L (ref 3.5–5.1)
PROT SERPL-MCNC: 6.1 G/DL (ref 6.4–8.2)
Q-T INTERVAL: 346 MS
Q-T INTERVAL: 366 MS
QRS DURATION: 72 MS
QRS DURATION: 90 MS
QTC CALCULATION (BEZET): 450 MS
QTC CALCULATION (BEZET): 483 MS
R AXIS: -7 DEGREES
R AXIS: 9 DEGREES
RBC # BLD AUTO: 3.74 X10(6)UL
SODIUM SERPL-SCNC: 134 MMOL/L (ref 136–145)
T AXIS: 57 DEGREES
T AXIS: 57 DEGREES
VENTRICULAR RATE: 102 BPM
VENTRICULAR RATE: 105 BPM
WBC # BLD AUTO: 4.6 X10(3) UL (ref 4–11)

## 2022-09-02 PROCEDURE — XW033E5 INTRODUCTION OF REMDESIVIR ANTI-INFECTIVE INTO PERIPHERAL VEIN, PERCUTANEOUS APPROACH, NEW TECHNOLOGY GROUP 5: ICD-10-PCS | Performed by: INTERNAL MEDICINE

## 2022-09-02 RX ORDER — AMLODIPINE BESYLATE 10 MG/1
10 TABLET ORAL DAILY
Status: DISCONTINUED | OUTPATIENT
Start: 2022-09-03 | End: 2022-09-30

## 2022-09-02 RX ORDER — MAGNESIUM OXIDE 400 MG/1
400 TABLET ORAL ONCE
Status: COMPLETED | OUTPATIENT
Start: 2022-09-02 | End: 2022-09-02

## 2022-09-02 RX ORDER — AMLODIPINE BESYLATE 5 MG/1
5 TABLET ORAL ONCE
Status: COMPLETED | OUTPATIENT
Start: 2022-09-02 | End: 2022-09-02

## 2022-09-02 RX ORDER — POTASSIUM CHLORIDE 20 MEQ/1
40 TABLET, EXTENDED RELEASE ORAL EVERY 4 HOURS
Status: COMPLETED | OUTPATIENT
Start: 2022-09-02 | End: 2022-09-02

## 2022-09-02 RX ORDER — LORAZEPAM 1 MG/1
1 TABLET ORAL EVERY 6 HOURS PRN
Status: DISCONTINUED | OUTPATIENT
Start: 2022-09-02 | End: 2022-09-03

## 2022-09-02 RX ORDER — LORAZEPAM 2 MG/ML
1 INJECTION INTRAMUSCULAR EVERY 6 HOURS PRN
Status: DISCONTINUED | OUTPATIENT
Start: 2022-09-02 | End: 2022-09-02

## 2022-09-02 RX ORDER — METOPROLOL TARTRATE 5 MG/5ML
5 INJECTION INTRAVENOUS ONCE
Status: DISCONTINUED | OUTPATIENT
Start: 2022-09-02 | End: 2022-09-02

## 2022-09-02 RX ORDER — AMLODIPINE BESYLATE 5 MG/1
5 TABLET ORAL DAILY
Status: DISCONTINUED | OUTPATIENT
Start: 2022-09-02 | End: 2022-09-02

## 2022-09-02 NOTE — PLAN OF CARE
NURSING ADMISSION NOTE      Patient admitted via Cart  Oriented to room. Safety precautions initiated. Bed in low position. Call light in reach. A&O x4, intermittent confusion at times with redirection needed. Hand tremors. [place]. RA. LORI - No CPAP. Tele - NSR w PACs. Lovenox. Scd's. Primofit in place. Briefed. No reports of pain. Able to roll side to side. Per wife, pt is independent but started using walker yesterday. Scraps on elbows from recent falls. IV SL. Regular diet - Poor appetite. Pt and wife updated on POC. Pt resting in bed with call light in reach.  Plan to see PT in AM.

## 2022-09-02 NOTE — PROGRESS NOTES
09/02/22 1400   Provider Notification   Reason for Communication Evaluate  (Pt afib rvr; HR in 120s-130s )   Provider Name Other (comment)  (Dr. Veronica Maki )   Method of Communication Page   Response Waiting for response   Notification Time [phone]

## 2022-09-02 NOTE — PROGRESS NOTES
09/02/22 1523 09/02/22 1525 09/02/22 1527   Vital Signs   /89 124/54 92/50   MAP (mmHg) 104 70 61   BP Location Left arm Left arm Left arm   BP Method Automatic Automatic Automatic   Patient Position Lying Sitting Standing

## 2022-09-03 ENCOUNTER — APPOINTMENT (OUTPATIENT)
Dept: GENERAL RADIOLOGY | Facility: HOSPITAL | Age: 82
DRG: 853 | End: 2022-09-03
Attending: INTERNAL MEDICINE
Payer: MEDICARE

## 2022-09-03 ENCOUNTER — APPOINTMENT (OUTPATIENT)
Dept: CV DIAGNOSTICS | Facility: HOSPITAL | Age: 82
DRG: 853 | End: 2022-09-03
Attending: HOSPITALIST
Payer: MEDICARE

## 2022-09-03 LAB
ANION GAP SERPL CALC-SCNC: 10 MMOL/L (ref 0–18)
ARTERIAL PATENCY WRIST A: POSITIVE
BASE EXCESS BLDA CALC-SCNC: 1 MMOL/L (ref ?–2)
BODY TEMPERATURE: 98.6 F
BUN BLD-MCNC: 18 MG/DL (ref 7–18)
CALCIUM BLD-MCNC: 9.2 MG/DL (ref 8.5–10.1)
CHLORIDE SERPL-SCNC: 104 MMOL/L (ref 98–112)
CO2 SERPL-SCNC: 21 MMOL/L (ref 21–32)
COHGB MFR BLD: 1.7 % SAT (ref 0–3)
CREAT BLD-MCNC: 1.06 MG/DL
ERYTHROCYTE [DISTWIDTH] IN BLOOD BY AUTOMATED COUNT: 14.6 %
FIO2: 100 %
GFR SERPLBLD BASED ON 1.73 SQ M-ARVRAT: 71 ML/MIN/1.73M2 (ref 60–?)
GLUCOSE BLD-MCNC: 136 MG/DL (ref 70–99)
GLUCOSE BLD-MCNC: 157 MG/DL (ref 70–99)
HCO3 BLDA-SCNC: 25.6 MEQ/L (ref 21–27)
HCT VFR BLD AUTO: 42.2 %
HGB BLD-MCNC: 13.9 G/DL
HGB BLD-MCNC: 14.2 G/DL
L/M: 40 L/MIN
MAGNESIUM SERPL-MCNC: 1.6 MG/DL (ref 1.6–2.6)
MCH RBC QN AUTO: 33.5 PG (ref 26–34)
MCHC RBC AUTO-ENTMCNC: 33.6 G/DL (ref 31–37)
MCV RBC AUTO: 99.5 FL
METHGB MFR BLD: 0.7 % SAT (ref 0.4–1.5)
OSMOLALITY SERPL CALC.SUM OF ELEC: 285 MOSM/KG (ref 275–295)
OXYHGB MFR BLDA: 93.2 % (ref 92–100)
PCO2 BLDA: 26 MM HG (ref 35–45)
PH BLDA: 7.54 [PH] (ref 7.35–7.45)
PLATELET # BLD AUTO: 234 10(3)UL (ref 150–450)
PO2 BLDA: 69 MM HG (ref 80–100)
POTASSIUM SERPL-SCNC: 4.4 MMOL/L (ref 3.5–5.1)
PROCALCITONIN SERPL-MCNC: 0.22 NG/ML (ref ?–0.16)
RBC # BLD AUTO: 4.24 X10(6)UL
SODIUM SERPL-SCNC: 135 MMOL/L (ref 136–145)
WBC # BLD AUTO: 6.4 X10(3) UL (ref 4–11)

## 2022-09-03 PROCEDURE — 99291 CRITICAL CARE FIRST HOUR: CPT | Performed by: INTERNAL MEDICINE

## 2022-09-03 PROCEDURE — 3E033XZ INTRODUCTION OF VASOPRESSOR INTO PERIPHERAL VEIN, PERCUTANEOUS APPROACH: ICD-10-PCS | Performed by: NURSE PRACTITIONER

## 2022-09-03 PROCEDURE — 5A0935A ASSISTANCE WITH RESPIRATORY VENTILATION, LESS THAN 24 CONSECUTIVE HOURS, HIGH NASAL FLOW/VELOCITY: ICD-10-PCS | Performed by: NURSE PRACTITIONER

## 2022-09-03 PROCEDURE — 30233J1 TRANSFUSION OF NONAUTOLOGOUS SERUM ALBUMIN INTO PERIPHERAL VEIN, PERCUTANEOUS APPROACH: ICD-10-PCS | Performed by: NURSE PRACTITIONER

## 2022-09-03 PROCEDURE — 5A09357 ASSISTANCE WITH RESPIRATORY VENTILATION, LESS THAN 24 CONSECUTIVE HOURS, CONTINUOUS POSITIVE AIRWAY PRESSURE: ICD-10-PCS | Performed by: NURSE PRACTITIONER

## 2022-09-03 PROCEDURE — 3E0333Z INTRODUCTION OF ANTI-INFLAMMATORY INTO PERIPHERAL VEIN, PERCUTANEOUS APPROACH: ICD-10-PCS | Performed by: NURSE PRACTITIONER

## 2022-09-03 PROCEDURE — 71045 X-RAY EXAM CHEST 1 VIEW: CPT | Performed by: INTERNAL MEDICINE

## 2022-09-03 PROCEDURE — 93306 TTE W/DOPPLER COMPLETE: CPT | Performed by: HOSPITALIST

## 2022-09-03 RX ORDER — LORAZEPAM 2 MG/1
4 TABLET ORAL EVERY 30 MIN PRN
Status: DISCONTINUED | OUTPATIENT
Start: 2022-09-03 | End: 2022-09-06

## 2022-09-03 RX ORDER — LORAZEPAM 1 MG/1
1 TABLET ORAL
Status: DISCONTINUED | OUTPATIENT
Start: 2022-09-03 | End: 2022-09-03

## 2022-09-03 RX ORDER — GLYCOPYRROLATE 1 MG/1
1 TABLET ORAL 3 TIMES DAILY
Status: DISCONTINUED | OUTPATIENT
Start: 2022-09-03 | End: 2022-09-30

## 2022-09-03 RX ORDER — ALBUMIN, HUMAN INJ 5% 5 %
250 SOLUTION INTRAVENOUS ONCE
Status: COMPLETED | OUTPATIENT
Start: 2022-09-03 | End: 2022-09-03

## 2022-09-03 RX ORDER — METOPROLOL TARTRATE 5 MG/5ML
5 INJECTION INTRAVENOUS ONCE
Status: COMPLETED | OUTPATIENT
Start: 2022-09-03 | End: 2022-09-03

## 2022-09-03 RX ORDER — LORAZEPAM 2 MG/1
2 TABLET ORAL EVERY 30 MIN PRN
Status: DISCONTINUED | OUTPATIENT
Start: 2022-09-03 | End: 2022-09-06

## 2022-09-03 RX ORDER — METOPROLOL TARTRATE 5 MG/5ML
INJECTION INTRAVENOUS
Status: DISPENSED
Start: 2022-09-03 | End: 2022-09-03

## 2022-09-03 RX ORDER — MAGNESIUM SULFATE HEPTAHYDRATE 40 MG/ML
2 INJECTION, SOLUTION INTRAVENOUS ONCE
Status: COMPLETED | OUTPATIENT
Start: 2022-09-03 | End: 2022-09-03

## 2022-09-03 RX ORDER — LORAZEPAM 1 MG/1
1 TABLET ORAL EVERY 30 MIN PRN
Status: DISCONTINUED | OUTPATIENT
Start: 2022-09-03 | End: 2022-09-06

## 2022-09-03 RX ORDER — DIAZEPAM 5 MG/ML
INJECTION, SOLUTION INTRAMUSCULAR; INTRAVENOUS
Status: COMPLETED
Start: 2022-09-03 | End: 2022-09-03

## 2022-09-03 RX ORDER — FUROSEMIDE 10 MG/ML
INJECTION INTRAMUSCULAR; INTRAVENOUS
Status: DISPENSED
Start: 2022-09-03 | End: 2022-09-03

## 2022-09-03 RX ORDER — LORAZEPAM 2 MG/1
2 TABLET ORAL
Status: DISCONTINUED | OUTPATIENT
Start: 2022-09-03 | End: 2022-09-03

## 2022-09-03 RX ORDER — DIAZEPAM 5 MG/ML
2 INJECTION, SOLUTION INTRAMUSCULAR; INTRAVENOUS ONCE
Status: DISCONTINUED | OUTPATIENT
Start: 2022-09-03 | End: 2022-09-05 | Stop reason: ALTCHOICE

## 2022-09-03 RX ORDER — PHENOBARBITAL SODIUM 65 MG/ML
32.5 INJECTION INTRAMUSCULAR EVERY 8 HOURS PRN
Status: DISCONTINUED | OUTPATIENT
Start: 2022-09-03 | End: 2022-09-09

## 2022-09-03 RX ORDER — LORAZEPAM 2 MG/ML
4 CONCENTRATE ORAL EVERY 30 MIN PRN
Status: DISCONTINUED | OUTPATIENT
Start: 2022-09-03 | End: 2022-09-06

## 2022-09-03 RX ORDER — LORAZEPAM 2 MG/ML
1 CONCENTRATE ORAL EVERY 30 MIN PRN
Status: DISCONTINUED | OUTPATIENT
Start: 2022-09-03 | End: 2022-09-06

## 2022-09-03 RX ORDER — PHENOBARBITAL SODIUM 65 MG/ML
65 INJECTION INTRAMUSCULAR EVERY 8 HOURS PRN
Status: DISCONTINUED | OUTPATIENT
Start: 2022-09-03 | End: 2022-09-09

## 2022-09-03 RX ORDER — ACETAMINOPHEN 10 MG/ML
1000 INJECTION, SOLUTION INTRAVENOUS ONCE
Status: COMPLETED | OUTPATIENT
Start: 2022-09-03 | End: 2022-09-03

## 2022-09-03 RX ORDER — FUROSEMIDE 10 MG/ML
40 INJECTION INTRAMUSCULAR; INTRAVENOUS ONCE
Status: COMPLETED | OUTPATIENT
Start: 2022-09-03 | End: 2022-09-03

## 2022-09-03 RX ORDER — DEXAMETHASONE SODIUM PHOSPHATE 4 MG/ML
6 VIAL (ML) INJECTION EVERY 24 HOURS
Status: DISCONTINUED | OUTPATIENT
Start: 2022-09-03 | End: 2022-09-04

## 2022-09-03 RX ORDER — LORAZEPAM 2 MG/ML
3 CONCENTRATE ORAL EVERY 30 MIN PRN
Status: DISCONTINUED | OUTPATIENT
Start: 2022-09-03 | End: 2022-09-06

## 2022-09-03 RX ORDER — LORAZEPAM 2 MG/ML
2 CONCENTRATE ORAL EVERY 30 MIN PRN
Status: DISCONTINUED | OUTPATIENT
Start: 2022-09-03 | End: 2022-09-06

## 2022-09-03 RX ORDER — SODIUM CHLORIDE 9 MG/ML
500 INJECTION, SOLUTION INTRAVENOUS ONCE
Status: COMPLETED | OUTPATIENT
Start: 2022-09-03 | End: 2022-09-03

## 2022-09-03 NOTE — PROGRESS NOTES
09/03/22 0254   Provider Notification   Reason for Communication Rapid response team   Response At bedside   Notification Time [phone]       Upon pt presentation at this time pt was increasingly lethargic, unable to follow commands and change in vital signs. HR 150s- ekg afib rvr, elevated sbp>160, febrile 101.2 and O2 sats 80% on RA, RR >30s. Placed on NRB and RRT called and at bedside. See MAR for orders, transfer pt to ICU room 467. This RN attempted to call wife Peg and left voicemail to update on poc.

## 2022-09-03 NOTE — PLAN OF CARE
Pt does not arouse to painful stimulus upon AM assessment. Increased alertness this afternoon. Incomprehensible speech at times. Responding yes, no, okay. Able to state name upon evening assessment. Per pt wife, pt is a heavy etoh drinker. Intensol given per CIWA protocol. Max CIWA 14. NSR. 500mL NS bolus and albumin given for hypotension. Norepinephrine infusion initiated and titrated off. Maintaining spo2 saturations on bipap mask 40% fio2. Desaturation when attempting to wean bipap to [address]. Echo at bedside, results pending. Approximately 700mL urine output. Updates provided to pt wife, Peg Abhilash.

## 2022-09-03 NOTE — PLAN OF CARE
Received from floor via RRT team at [phone]. Arrives on BiPAP 15/8, 100 (has been decreased to 0.5). Per report, NT suctioned at RRT. NT suction x3 since arriving - tolerates well with thick tan secretions. At 0400 was unresponsive. Noted eyes with regular side to side movements, eyes blinking rhythmically when forced open, left arm comes up as if in abnormal flexion position and gaze deviates to the left, tremors to arms/hands. At 0430 would briefly open eyes to name and answer questions in short bursts. Stated age was 61, denied pain and indicated was cold. Still did not follow commands, pupils remain 2B throughout. APN for neuro/critical care at bedside. Appears afib RVR on monitor, difficult to obtain BP given variable pulse pressure. Right side with crackles posteriorly and rhonchi throughout. Pulses 1+, no edema, diaphoretic and pale. IV Tylenol given. Tachycardic and tachypneic. With decreased responsiveness concern over sedatives but with high HR/RR and tremors and diaphoresis his condition indicates he requires meds for DT's - spoke with APN and phenobarb given. Also hoping Tylenol will help fever and bring HR/RR down. Soles of feet red, scab on toe, arms bruised (frequent falls at home). SCD's applied over PVD-like discolored legs. Primo fit external male catheter placed to suction, shirlene urine (not sure if he is dry or is bili from ETOH use). See assessments and flowsheets for further data.

## 2022-09-04 LAB
ALBUMIN SERPL-MCNC: 2.3 G/DL (ref 3.4–5)
ALBUMIN/GLOB SERPL: 0.7 {RATIO} (ref 1–2)
ALP LIVER SERPL-CCNC: 53 U/L
ALT SERPL-CCNC: 16 U/L
ANION GAP SERPL CALC-SCNC: 6 MMOL/L (ref 0–18)
AST SERPL-CCNC: 29 U/L (ref 15–37)
BASOPHILS # BLD AUTO: 0.02 X10(3) UL (ref 0–0.2)
BASOPHILS NFR BLD AUTO: 0.3 %
BILIRUB SERPL-MCNC: 1.3 MG/DL (ref 0.1–2)
BUN BLD-MCNC: 29 MG/DL (ref 7–18)
CALCIUM BLD-MCNC: 8.3 MG/DL (ref 8.5–10.1)
CHLORIDE SERPL-SCNC: 109 MMOL/L (ref 98–112)
CO2 SERPL-SCNC: 21 MMOL/L (ref 21–32)
CREAT BLD-MCNC: 0.95 MG/DL
EOSINOPHIL # BLD AUTO: 0 X10(3) UL (ref 0–0.7)
EOSINOPHIL NFR BLD AUTO: 0 %
ERYTHROCYTE [DISTWIDTH] IN BLOOD BY AUTOMATED COUNT: 14.8 %
GFR SERPLBLD BASED ON 1.73 SQ M-ARVRAT: 81 ML/MIN/1.73M2 (ref 60–?)
GLOBULIN PLAS-MCNC: 3.4 G/DL (ref 2.8–4.4)
GLUCOSE BLD-MCNC: 135 MG/DL (ref 70–99)
HCT VFR BLD AUTO: 36.9 %
HGB BLD-MCNC: 11.2 G/DL
IMM GRANULOCYTES # BLD AUTO: 0.04 X10(3) UL (ref 0–1)
IMM GRANULOCYTES NFR BLD: 0.7 %
LYMPHOCYTES # BLD AUTO: 0.44 X10(3) UL (ref 1–4)
LYMPHOCYTES NFR BLD AUTO: 7.3 %
MAGNESIUM SERPL-MCNC: 2 MG/DL (ref 1.6–2.6)
MCH RBC QN AUTO: 33.6 PG (ref 26–34)
MCHC RBC AUTO-ENTMCNC: 30.4 G/DL (ref 31–37)
MCV RBC AUTO: 110.8 FL
MONOCYTES # BLD AUTO: 0.25 X10(3) UL (ref 0.1–1)
MONOCYTES NFR BLD AUTO: 4.1 %
NEUTROPHILS # BLD AUTO: 5.31 X10 (3) UL (ref 1.5–7.7)
NEUTROPHILS # BLD AUTO: 5.31 X10(3) UL (ref 1.5–7.7)
NEUTROPHILS NFR BLD AUTO: 87.6 %
OSMOLALITY SERPL CALC.SUM OF ELEC: 290 MOSM/KG (ref 275–295)
PHOSPHATE SERPL-MCNC: 3.4 MG/DL (ref 2.5–4.9)
PLATELET # BLD AUTO: 150 10(3)UL (ref 150–450)
POTASSIUM SERPL-SCNC: 3.5 MMOL/L (ref 3.5–5.1)
PROT SERPL-MCNC: 5.7 G/DL (ref 6.4–8.2)
RBC # BLD AUTO: 3.33 X10(6)UL
SODIUM SERPL-SCNC: 136 MMOL/L (ref 136–145)
WBC # BLD AUTO: 6.1 X10(3) UL (ref 4–11)

## 2022-09-04 RX ORDER — METHYLPREDNISOLONE SODIUM SUCCINATE 125 MG/2ML
60 INJECTION, POWDER, LYOPHILIZED, FOR SOLUTION INTRAMUSCULAR; INTRAVENOUS EVERY 6 HOURS
Status: DISCONTINUED | OUTPATIENT
Start: 2022-09-04 | End: 2022-09-06

## 2022-09-04 RX ORDER — METOPROLOL TARTRATE 5 MG/5ML
5 INJECTION INTRAVENOUS EVERY 4 HOURS
Status: DISCONTINUED | OUTPATIENT
Start: 2022-09-04 | End: 2022-09-07

## 2022-09-04 RX ORDER — FUROSEMIDE 10 MG/ML
40 INJECTION INTRAMUSCULAR; INTRAVENOUS ONCE
Status: COMPLETED | OUTPATIENT
Start: 2022-09-04 | End: 2022-09-04

## 2022-09-04 NOTE — PLAN OF CARE
Somnolent, opens eyes to name, nonspecific eye contact. Removed BiPAP for oral care and maintains sats 98% on room air. Allowed oral care to be done, did swallow but is being kept NPO for now. When I ask his name he states \"Herb\" but will not answer any other questions. Nods head \"no\" when I ask if he is having pain. Does squeeze my hands strongly to repeated command but does nothing else and will not release  to command. Mild tremors generalized but most noticable LUE stephanie with stimulation. BiPAP 15/8, titrated to 0.3. He always positions left arm in abnormal flexion position, but he will allow it to be at the side of body for a few minutes before moving it back. NSR with PVC's on monitor. Right side with crackles and rhonchi posteriorly and is diminished. Left with fine crackles to posterior base. Pulses +2 radial and +1 pedals, no edema. Mildly diaphoretic. Unable to obtain CIWA due to somnolence. Soles of feet red, scab on right great toe, arms bruised (frequent falls at home) with small bandaids around each elbow. SCD's applied over PVD-like discolored legs. Primo fit external male catheter to suction, shirlene urine in cannister. See assessments and flowsheets for further data.

## 2022-09-04 NOTE — PLAN OF CARE
Pt alert. Improved mentation from yesterday. Oriented to self, date. Confused. Impulsive. High fall risk precautions in place. Following simple commands x4 extremities. Bipap masked weaned to HFNC 5L, well tolerated. Periods of tachypnea. Congested cough. Moderate amounts of thick green/tan sputum with NT suctioning. VSS. NSR. Max CIWA 12. SLP eval this afternoon. NPO per SLP recommendations. Video swallow 9/6/22. Approximately 2200mL urine output. Updates provided to pt daughter and wife. Plan of care discussed.

## 2022-09-05 ENCOUNTER — APPOINTMENT (OUTPATIENT)
Dept: CT IMAGING | Facility: HOSPITAL | Age: 82
DRG: 853 | End: 2022-09-05
Attending: INTERNAL MEDICINE
Payer: MEDICARE

## 2022-09-05 LAB
ALBUMIN SERPL-MCNC: 2.4 G/DL (ref 3.4–5)
ALBUMIN/GLOB SERPL: 0.7 {RATIO} (ref 1–2)
ALP LIVER SERPL-CCNC: 60 U/L
ALT SERPL-CCNC: 18 U/L
ANION GAP SERPL CALC-SCNC: 7 MMOL/L (ref 0–18)
AST SERPL-CCNC: 22 U/L (ref 15–37)
ATRIAL RATE: 111 BPM
ATRIAL RATE: 125 BPM
ATRIAL RATE: 138 BPM
BASOPHILS # BLD AUTO: 0 X10(3) UL (ref 0–0.2)
BASOPHILS NFR BLD AUTO: 0 %
BILIRUB SERPL-MCNC: 1.1 MG/DL (ref 0.1–2)
BUN BLD-MCNC: 31 MG/DL (ref 7–18)
CALCIUM BLD-MCNC: 8.5 MG/DL (ref 8.5–10.1)
CHLORIDE SERPL-SCNC: 105 MMOL/L (ref 98–112)
CO2 SERPL-SCNC: 25 MMOL/L (ref 21–32)
CREAT BLD-MCNC: 0.76 MG/DL
EOSINOPHIL # BLD AUTO: 0 X10(3) UL (ref 0–0.7)
EOSINOPHIL NFR BLD AUTO: 0 %
ERYTHROCYTE [DISTWIDTH] IN BLOOD BY AUTOMATED COUNT: 14.1 %
GFR SERPLBLD BASED ON 1.73 SQ M-ARVRAT: 91 ML/MIN/1.73M2 (ref 60–?)
GLOBULIN PLAS-MCNC: 3.5 G/DL (ref 2.8–4.4)
GLUCOSE BLD-MCNC: 197 MG/DL (ref 70–99)
HCT VFR BLD AUTO: 37.2 %
HGB BLD-MCNC: 12.5 G/DL
IMM GRANULOCYTES # BLD AUTO: 0.03 X10(3) UL (ref 0–1)
IMM GRANULOCYTES NFR BLD: 0.5 %
LYMPHOCYTES # BLD AUTO: 0.34 X10(3) UL (ref 1–4)
LYMPHOCYTES NFR BLD AUTO: 5.9 %
MAGNESIUM SERPL-MCNC: 2 MG/DL (ref 1.6–2.6)
MCH RBC QN AUTO: 33.2 PG (ref 26–34)
MCHC RBC AUTO-ENTMCNC: 33.6 G/DL (ref 31–37)
MCV RBC AUTO: 98.9 FL
MONOCYTES # BLD AUTO: 0.19 X10(3) UL (ref 0.1–1)
MONOCYTES NFR BLD AUTO: 3.3 %
NEUTROPHILS # BLD AUTO: 5.22 X10 (3) UL (ref 1.5–7.7)
NEUTROPHILS # BLD AUTO: 5.22 X10(3) UL (ref 1.5–7.7)
NEUTROPHILS NFR BLD AUTO: 90.3 %
OSMOLALITY SERPL CALC.SUM OF ELEC: 296 MOSM/KG (ref 275–295)
PHOSPHATE SERPL-MCNC: 3.1 MG/DL (ref 2.5–4.9)
PLATELET # BLD AUTO: 181 10(3)UL (ref 150–450)
POTASSIUM SERPL-SCNC: 3.2 MMOL/L (ref 3.5–5.1)
POTASSIUM SERPL-SCNC: 3.2 MMOL/L (ref 3.5–5.1)
PROT SERPL-MCNC: 5.9 G/DL (ref 6.4–8.2)
Q-T INTERVAL: 328 MS
Q-T INTERVAL: 332 MS
Q-T INTERVAL: 344 MS
QRS DURATION: 68 MS
QRS DURATION: 70 MS
QRS DURATION: 80 MS
QTC CALCULATION (BEZET): 449 MS
QTC CALCULATION (BEZET): 456 MS
QTC CALCULATION (BEZET): 467 MS
R AXIS: -15 DEGREES
R AXIS: -18 DEGREES
R AXIS: -22 DEGREES
RBC # BLD AUTO: 3.76 X10(6)UL
SODIUM SERPL-SCNC: 137 MMOL/L (ref 136–145)
T AXIS: 14 DEGREES
T AXIS: 21 DEGREES
T AXIS: 44 DEGREES
VENTRICULAR RATE: 106 BPM
VENTRICULAR RATE: 110 BPM
VENTRICULAR RATE: 122 BPM
WBC # BLD AUTO: 5.8 X10(3) UL (ref 4–11)

## 2022-09-05 PROCEDURE — 70450 CT HEAD/BRAIN W/O DYE: CPT | Performed by: INTERNAL MEDICINE

## 2022-09-05 PROCEDURE — 5A09357 ASSISTANCE WITH RESPIRATORY VENTILATION, LESS THAN 24 CONSECUTIVE HOURS, CONTINUOUS POSITIVE AIRWAY PRESSURE: ICD-10-PCS | Performed by: INTERNAL MEDICINE

## 2022-09-05 NOTE — PLAN OF CARE
Pt a/o to self, place, month/year. Confused. Forgetful. Impulsive. CT head this AM, see results. VSS. O2 titrated off. No s/s of respiratory distress on RA. Congested cough. Sputum Cx pending. NPO. Video swallow tomorrow. Approximately 600mL urine output. PT tomorrow. Max CIWA 7. Pt wife at bedside. Covid screening performed. Plan of care discussed.

## 2022-09-05 NOTE — PLAN OF CARE
Alert to drowsy, mildly agitated when awake, opens eyes to name, brief eye contact. Is on room air. NPO due to aspiration risk. When I ask his name he states \"Herb\", when I ask where he is he at first states \"[facility]\" and eventually states \"hospital\", states year is \"2022\", states \"3rd month\" of the year. He is unable to be reoriented to name of hospital/situation/date. Denies pain and dyspnea. States is nauseated the first time I ask but denies 15 seconds later. Need to repeat each question twice, frequently looks away and mutters something I can't understand. Equal strength to upper extremities again and note more movement spontaneously from left arm as well as being quicker to follow commands. Mild tremors generalized but increase with stimulation. NSR. Right side with crackles and is diminished. Left clear and diminished. Pulses +2 radial and +1 pedals, no edema. Soles of feet red, scab on right great toe, arms bruised (frequent falls at home) with small bandaids around each elbow. SCD's remain intact over PVD-like discolored legs. Primo fit external male catheter to suction, clear yellow urine in cannister. See assessments and flowsheets for further data.

## 2022-09-06 ENCOUNTER — APPOINTMENT (OUTPATIENT)
Dept: MRI IMAGING | Facility: HOSPITAL | Age: 82
DRG: 853 | End: 2022-09-06
Attending: HOSPITALIST
Payer: MEDICARE

## 2022-09-06 LAB
ALBUMIN SERPL-MCNC: 2.2 G/DL (ref 3.4–5)
ALBUMIN/GLOB SERPL: 0.6 {RATIO} (ref 1–2)
ALP LIVER SERPL-CCNC: 79 U/L
ALT SERPL-CCNC: 18 U/L
ANION GAP SERPL CALC-SCNC: 7 MMOL/L (ref 0–18)
AST SERPL-CCNC: 18 U/L (ref 15–37)
BASOPHILS # BLD AUTO: 0 X10(3) UL (ref 0–0.2)
BASOPHILS NFR BLD AUTO: 0 %
BILIRUB SERPL-MCNC: 1 MG/DL (ref 0.1–2)
BUN BLD-MCNC: 28 MG/DL (ref 7–18)
CALCIUM BLD-MCNC: 8.1 MG/DL (ref 8.5–10.1)
CHLORIDE SERPL-SCNC: 105 MMOL/L (ref 98–112)
CHOLEST SERPL-MCNC: 78 MG/DL (ref ?–200)
CO2 SERPL-SCNC: 27 MMOL/L (ref 21–32)
CREAT BLD-MCNC: 0.72 MG/DL
EOSINOPHIL # BLD AUTO: 0 X10(3) UL (ref 0–0.7)
EOSINOPHIL NFR BLD AUTO: 0 %
ERYTHROCYTE [DISTWIDTH] IN BLOOD BY AUTOMATED COUNT: 13.9 %
EST. AVERAGE GLUCOSE BLD GHB EST-MCNC: 114 MG/DL (ref 68–126)
GFR SERPLBLD BASED ON 1.73 SQ M-ARVRAT: 93 ML/MIN/1.73M2 (ref 60–?)
GLOBULIN PLAS-MCNC: 3.6 G/DL (ref 2.8–4.4)
GLUCOSE BLD-MCNC: 233 MG/DL (ref 70–99)
HBA1C MFR BLD: 5.6 % (ref ?–5.7)
HCT VFR BLD AUTO: 39.3 %
HDLC SERPL-MCNC: 35 MG/DL (ref 40–59)
HGB BLD-MCNC: 13 G/DL
IMM GRANULOCYTES # BLD AUTO: 0.03 X10(3) UL (ref 0–1)
IMM GRANULOCYTES NFR BLD: 0.5 %
LDLC SERPL CALC-MCNC: 24 MG/DL (ref ?–100)
LYMPHOCYTES # BLD AUTO: 0.38 X10(3) UL (ref 1–4)
LYMPHOCYTES NFR BLD AUTO: 6.5 %
MAGNESIUM SERPL-MCNC: 2.2 MG/DL (ref 1.6–2.6)
MCH RBC QN AUTO: 32.9 PG (ref 26–34)
MCHC RBC AUTO-ENTMCNC: 33.1 G/DL (ref 31–37)
MCV RBC AUTO: 99.5 FL
MONOCYTES # BLD AUTO: 0.26 X10(3) UL (ref 0.1–1)
MONOCYTES NFR BLD AUTO: 4.4 %
NEUTROPHILS # BLD AUTO: 5.18 X10 (3) UL (ref 1.5–7.7)
NEUTROPHILS # BLD AUTO: 5.18 X10(3) UL (ref 1.5–7.7)
NEUTROPHILS NFR BLD AUTO: 88.6 %
NONHDLC SERPL-MCNC: 43 MG/DL (ref ?–130)
OSMOLALITY SERPL CALC.SUM OF ELEC: 301 MOSM/KG (ref 275–295)
PHOSPHATE SERPL-MCNC: 2.4 MG/DL (ref 2.5–4.9)
PLATELET # BLD AUTO: 186 10(3)UL (ref 150–450)
POTASSIUM SERPL-SCNC: 3.2 MMOL/L (ref 3.5–5.1)
POTASSIUM SERPL-SCNC: 3.2 MMOL/L (ref 3.5–5.1)
PROT SERPL-MCNC: 5.8 G/DL (ref 6.4–8.2)
RBC # BLD AUTO: 3.95 X10(6)UL
SODIUM SERPL-SCNC: 139 MMOL/L (ref 136–145)
TRIGL SERPL-MCNC: 97 MG/DL (ref 30–149)
VLDLC SERPL CALC-MCNC: 13 MG/DL (ref 0–30)
WBC # BLD AUTO: 5.9 X10(3) UL (ref 4–11)

## 2022-09-06 PROCEDURE — 70551 MRI BRAIN STEM W/O DYE: CPT | Performed by: HOSPITALIST

## 2022-09-06 RX ORDER — ASPIRIN 300 MG/1
300 SUPPOSITORY RECTAL DAILY
Status: DISCONTINUED | OUTPATIENT
Start: 2022-09-06 | End: 2022-09-08

## 2022-09-06 RX ORDER — DIAZEPAM 5 MG/ML
0.5 INJECTION, SOLUTION INTRAMUSCULAR; INTRAVENOUS ONCE
Status: COMPLETED | OUTPATIENT
Start: 2022-09-06 | End: 2022-09-06

## 2022-09-06 RX ORDER — DIAZEPAM 2 MG/1
2 TABLET ORAL EVERY 12 HOURS PRN
Status: DISCONTINUED | OUTPATIENT
Start: 2022-09-06 | End: 2022-09-30

## 2022-09-06 RX ORDER — HYDRALAZINE HYDROCHLORIDE 20 MG/ML
10 INJECTION INTRAMUSCULAR; INTRAVENOUS EVERY 6 HOURS PRN
Status: DISCONTINUED | OUTPATIENT
Start: 2022-09-06 | End: 2022-09-06

## 2022-09-06 RX ORDER — POTASSIUM CHLORIDE 14.9 MG/ML
20 INJECTION INTRAVENOUS ONCE
Status: COMPLETED | OUTPATIENT
Start: 2022-09-06 | End: 2022-09-06

## 2022-09-06 RX ORDER — HYDRALAZINE HYDROCHLORIDE 20 MG/ML
10 INJECTION INTRAMUSCULAR; INTRAVENOUS EVERY 2 HOUR PRN
Status: DISCONTINUED | OUTPATIENT
Start: 2022-09-06 | End: 2022-09-30

## 2022-09-06 RX ORDER — METHYLPREDNISOLONE SODIUM SUCCINATE 40 MG/ML
40 INJECTION, POWDER, LYOPHILIZED, FOR SOLUTION INTRAMUSCULAR; INTRAVENOUS EVERY 12 HOURS
Status: DISCONTINUED | OUTPATIENT
Start: 2022-09-06 | End: 2022-09-08

## 2022-09-06 RX ORDER — ONDANSETRON 2 MG/ML
4 INJECTION INTRAMUSCULAR; INTRAVENOUS EVERY 6 HOURS PRN
Status: DISCONTINUED | OUTPATIENT
Start: 2022-09-06 | End: 2022-09-24

## 2022-09-06 NOTE — PLAN OF CARE
Assumed care of patient this am. O2 weaned off. Hypertensive, scheduled lopressor given. Speech at bedside, bedside eval passed. Patient with waxing/waning neuro exam. Mds aware. Patient placed back on NPO status due to possible aspiration with liquids on two attempts with this RN. PT/OT got patient to edge of bed - reported patient had R sided neglect/slurred speech/facial droop. RN discussed with critical care APN - as patient had Head CT 9/5 for similar waxing/waning neuro exam. Hospitalist ordered MRI STAT and Neurology consult. Transfer orders received. At time of note - patient in MRI - report called to Michaelle PFEIFFER with instruction to call neurology with MRI results. Wife at bedside updated on plan of care and stating that she would be taking his duffle bag with hearing aids and phone home with her.

## 2022-09-06 NOTE — PHYSICAL THERAPY NOTE
PHYSICAL THERAPY TREATMENT NOTE - INPATIENT    Room Number: 467/467-A     Session: 1     Number of Visits to Meet Established Goals: 4    History related to current admission: Patient is a 80year old male admitted on 9/1/2022 from home for generalized weakness and frequent falling. Pt diagnosed with hypokalemia. 9/3/22 Patient transferred to ICU post RRT for acute respiratory distress, tachycardia, and worsened mental state. Presenting Problem: generalized weakness and frequent falls     ASSESSMENT     Pt demonstrates significant R side weakness and R lean which was not present on initial evaluation. Pt denies decrease in sensation, and presents with poor insight into deficits , and poor endurance. RN made aware of pt's RUE weakness and inability to extend RUE. Pt with somewhat slurred speech. Will continue to follow while in house. The AM-PAC '6-Clicks' Inpatient Basic Mobility Short Form was completed and this patient is demonstrating a 87% degree of impairment in mobility. Research supports that patients with this level of impairment may benefit from SORIN. DISCHARGE RECOMMENDATIONS  PT Discharge Recommendations: Sub-acute rehabilitation     PLAN  PT Treatment Plan: Balance training;Transfer training;Gait training; Body mechanics; Bed mobility;Strengthening  Rehab Potential : Fair  Frequency (Obs):  (2-3x/week)    CURRENT GOALS        Goal #1 Patient is able to demonstrate supine - sit EOB @ level: minimum assistance      Goal #2 Patient is able to demonstrate transfers Sit to/from Stand at assistance level: moderate assistance      Goal #3 Patient is able to participate in further functional transfer assessment with therapy. Goal #4     Goal #5     Goal #6     Goal Comments: Goals established on 9/2/2022 9/6/2022 all goals ongoing      SUBJECTIVE  \"when does the ambulance come around? \"   Pt A&O x2, does not know what hospital he is in, however , guessed that he is in a hospital. When asked what month it is, pt states . OBJECTIVE  Precautions: Bed/chair alarm    WEIGHT BEARING RESTRICTION  Weight Bearing Restriction: None                PAIN ASSESSMENT   Ratin  Location: pt denies        BALANCE                                                                                                                       Static Sitting: Poor +  Dynamic Sitting: Poor           Static Standing: Not tested  Dynamic Standing: Not tested    ACTIVITY TOLERANCE                        O2 WALK         AM-PAC '6-Clicks' INPATIENT SHORT FORM - BASIC MOBILITY  How much difficulty does the patient currently have. .. Patient Difficulty: Turning over in bed (including adjusting bedclothes, sheets and blankets)?: A Lot   Patient Difficulty: Sitting down on and standing up from a chair with arms (e.g., wheelchair, bedside commode, etc.): Unable   Patient Difficulty: Moving from lying on back to sitting on the side of the bed?: A Lot   How much help from another person does the patient currently need. .. Help from Another: Moving to and from a bed to a chair (including a wheelchair)?: Total   Help from Another: Need to walk in hospital room?: Total   Help from Another: Climbing 3-5 steps with a railing?: Total       AM-PAC Score:  Raw Score: 8   Approx Degree of Impairment: 86.62%   Standardized Score (AM-PAC Scale): 28.58   CMS Modifier (G-Code): CM    FUNCTIONAL ABILITY STATUS  Gait Assessment   Functional Mobility/Gait Assessment  Gait Assistance: Not tested  Distance (ft): 0    Skilled Therapy Provided  Pt presents in semi sup. Educated pt on role of PT and goals for session. Pt states he did not use AD PTA and mentions his L side is \"soft\". When asked to clarify, pt states L side \"stronger than R\" . Max A to tf to EOB, pt laying R UE with no awareness, pt demos R lean requires mod A to maintain midline position. Pt unable to lift RUE and hold.  Smile somewhat assymetric, and R eye more closed than L. Pt requires min to mod A to maintain balance EOB. /73 seated EOB. RN made aware and called in to assess. Max X 2 sit>sup and TA to reposition in bed. THERAPEUTIC EXERCISES  Lower Extremity Knee extension  LAQ     Upper Extremity  - open/close     Position Sitting     Repetitions      Sets        Patient End of Session: In bed;Needs met;Call light within reach;RN aware of session/findings; All patient questions and concerns addressed; Alarm set;SCDs in place

## 2022-09-06 NOTE — PLAN OF CARE
Alert and awake, when I walk in the room he makes eye contact, states \"hello\", is able to respond with short small talk phrases, most of which are appropriate. Constantly in motion, picking at diaper, SCD's, etc.  States it's Sept 2022 but when I ask where we are, he states an (incorrect) address. Unable to reorient. Placed on nasal cannula due to desat while awake (presently CPAP being set up). NPO due to aspiration risk. Denies pain, nausea and dyspnea. Equal strength to upper extremities, again note more movement spontaneously from left arm. Right leg slightly weaker than left. Tremors upper extremities. NSR. Right side with crackles and is diminished posteriorly. Left clear and diminished. Pulses +2 radial and +1 pedals, no edema. Soles of feet red, scab on right great toe, arms bruised (frequent falls at home) with small bandaids around each elbow. SCD's remain intact over PVD-like discolored legs. Primo fit external male catheter to suction, clear yellow urine in cannister. Sputum culture sent. See assessments and flowsheets for further data.

## 2022-09-07 ENCOUNTER — APPOINTMENT (OUTPATIENT)
Dept: CT IMAGING | Facility: HOSPITAL | Age: 82
DRG: 853 | End: 2022-09-07
Attending: Other
Payer: MEDICARE

## 2022-09-07 ENCOUNTER — APPOINTMENT (OUTPATIENT)
Dept: CV DIAGNOSTICS | Facility: HOSPITAL | Age: 82
DRG: 853 | End: 2022-09-07
Attending: Other
Payer: MEDICARE

## 2022-09-07 LAB
ANION GAP SERPL CALC-SCNC: 5 MMOL/L (ref 0–18)
BASOPHILS # BLD AUTO: 0 X10(3) UL (ref 0–0.2)
BASOPHILS NFR BLD AUTO: 0 %
BUN BLD-MCNC: 24 MG/DL (ref 7–18)
CALCIUM BLD-MCNC: 8.1 MG/DL (ref 8.5–10.1)
CHLORIDE SERPL-SCNC: 104 MMOL/L (ref 98–112)
CO2 SERPL-SCNC: 27 MMOL/L (ref 21–32)
CREAT BLD-MCNC: 0.68 MG/DL
EOSINOPHIL # BLD AUTO: 0 X10(3) UL (ref 0–0.7)
EOSINOPHIL NFR BLD AUTO: 0 %
ERYTHROCYTE [DISTWIDTH] IN BLOOD BY AUTOMATED COUNT: 13.6 %
GFR SERPLBLD BASED ON 1.73 SQ M-ARVRAT: 95 ML/MIN/1.73M2 (ref 60–?)
GLUCOSE BLD-MCNC: 193 MG/DL (ref 70–99)
GLUCOSE BLD-MCNC: 209 MG/DL (ref 70–99)
GLUCOSE BLD-MCNC: 227 MG/DL (ref 70–99)
HCT VFR BLD AUTO: 38.1 %
HGB BLD-MCNC: 12.7 G/DL
IMM GRANULOCYTES # BLD AUTO: 0.03 X10(3) UL (ref 0–1)
IMM GRANULOCYTES NFR BLD: 0.5 %
LYMPHOCYTES # BLD AUTO: 0.45 X10(3) UL (ref 1–4)
LYMPHOCYTES NFR BLD AUTO: 7.9 %
MCH RBC QN AUTO: 32.8 PG (ref 26–34)
MCHC RBC AUTO-ENTMCNC: 33.3 G/DL (ref 31–37)
MCV RBC AUTO: 98.4 FL
MONOCYTES # BLD AUTO: 0.44 X10(3) UL (ref 0.1–1)
MONOCYTES NFR BLD AUTO: 7.7 %
NEUTROPHILS # BLD AUTO: 4.81 X10 (3) UL (ref 1.5–7.7)
NEUTROPHILS # BLD AUTO: 4.81 X10(3) UL (ref 1.5–7.7)
NEUTROPHILS NFR BLD AUTO: 83.9 %
OSMOLALITY SERPL CALC.SUM OF ELEC: 293 MOSM/KG (ref 275–295)
PHOSPHATE SERPL-MCNC: 2.7 MG/DL (ref 2.5–4.9)
PLATELET # BLD AUTO: 179 10(3)UL (ref 150–450)
POTASSIUM SERPL-SCNC: 3.3 MMOL/L (ref 3.5–5.1)
POTASSIUM SERPL-SCNC: 3.3 MMOL/L (ref 3.5–5.1)
RBC # BLD AUTO: 3.87 X10(6)UL
SODIUM SERPL-SCNC: 136 MMOL/L (ref 136–145)
VANCOMYCIN PEAK SERPL-MCNC: 26.2 UG/ML (ref 30–50)
WBC # BLD AUTO: 5.7 X10(3) UL (ref 4–11)

## 2022-09-07 PROCEDURE — 99291 CRITICAL CARE FIRST HOUR: CPT | Performed by: OTHER

## 2022-09-07 PROCEDURE — 70498 CT ANGIOGRAPHY NECK: CPT | Performed by: OTHER

## 2022-09-07 PROCEDURE — 70496 CT ANGIOGRAPHY HEAD: CPT | Performed by: OTHER

## 2022-09-07 RX ORDER — IOHEXOL 350 MG/ML
75 INJECTION, SOLUTION INTRAVENOUS
Status: COMPLETED | OUTPATIENT
Start: 2022-09-07 | End: 2022-09-07

## 2022-09-07 RX ORDER — VANCOMYCIN HYDROCHLORIDE
25 ONCE
Status: COMPLETED | OUTPATIENT
Start: 2022-09-07 | End: 2022-09-07

## 2022-09-07 NOTE — PLAN OF CARE
Assumed care of patient this am, neuro exams Q4hrs. Passed bedside swallow eval. Good appetite. Worked with PT/OT. Plan for CTA Head/Neck. SBP Goal 110-170. VSS. SpCx reviewed with MD. Plan for Echocardiogram with bubble study once patients 10 day covid iso is discontinued. Transfer orders. Wife at bedside updated on plan of care.

## 2022-09-07 NOTE — RESPIRATORY THERAPY NOTE
09/07/22 0547   BiPAP   Device V60   BiPAP / CPAP CE# V28   BiPAP bacteria filter Yes   BiPAP Pre-use check ok? Yes   BIPAP plugged into main power?  Yes   Mode CPAP   Interface Full face mask   Mask Size Medium   Control Settings   Set CPAP 12   Oxygen Percent 30 %   C-Flex 2   SIPAP   Resp 17   FiO2 (%) 30 %   BiPAP/CPAP Alarm Settings   Hi Rate 50   Low Rate 10   Hi VT 1200   Low    Hi Pressure 20   Low Pressure 8   Low Pressure Delay 20   Low MV 2   BiPAP/CPAP Monitored Parameters   Pulse 61   SpO2 99 %   PIP 12   Total Rate 17 breaths/min   Minute Volume 15   Tidal Volume 783   Total Leak 19   Trigger % 100   Ti/Ttot % 38   LORI Protocol Yes   Toleration Well

## 2022-09-07 NOTE — PROGRESS NOTES
Received Pt at roughly 19:45pm. During report, this RN received notification of MRI results. Neuro was informed and requested pt to be transferred to CTU 7. No beds were available at this time so pt was transferred back to ICU where he originally was prior to [facility] transfer. Received pt Alert to self and year. Drowsy, with Right eye closed with minimal opening. Right side hand grasp weaker than left side. Difficulty adjusting himself in bed. VSS, on RA. IV SL. Pt denied any Pain,N/V/D/C. Able to answer some questions, yet still very drowsy but arousalable. MD notified of transfer per Neurology. (Per neurology- order to start stroke protocol when pt is transferred). This RN provided ICU RN with report. Pt and spouse at bedside updated on POC.

## 2022-09-07 NOTE — SLP NOTE
SPEECH DAILY NOTE - INPATIENT    ASSESSMENT & PLAN   ASSESSMENT  Patient seen today for ongoing assessment of oropharyngeal swallow. Patient more alert today and asking appropriate questions. RN reported no sedating medications given for >24 hours. Patient in an upright seated position following PT/OT session. PO trials of thin liquids, puree, and regular solids provided. Bolus acceptance was adequate without evidence of anterior bolus loss. Mastication and AP bolus transit were thorough and efficient without evidence of oral residue. Pharyngeal swallow initiation appeared timely and hyolaryngeal excursion was adequate per palpation. No overt s/s of aspiration observed. However, patient exhibiting mild impulsivity with rate of intake despite verbal cues. Patient appears safe to initiate a regular diet and thin liquids given mental status does not deteriorate. SLP will continue to follow to monitor diet tolerance and adjust as appropriate. Discussed with RN. Diet Recommendations - Solids: Regular  Diet Recommendations - Liquids: Thin Liquids    Compensatory Strategies Recommended: Small bites and sips  Aspiration Precautions: Upright position  Medication Administration Recommendations: One pill at a time    Patient Experiencing Pain: No                Discharge Recommendations  Discharge Recommendations/Plan: Undetermined    Treatment Plan  Treatment Plan/Recommendations: Aspiration precautions    Interdisciplinary Communication: Discussed with RN            GOALS  Goal #1 The patient will tolerate regular consistency and thin liquids without overt signs or symptoms of aspiration with 90 % accuracy over 1-2 session(s). In Progress   Goal #2 The patient/family/caregiver will demonstrate understanding and implementation of aspiration precautions and swallow strategies independently over 1-2 session(s). In Progress   Goal #3 Cognitive communication evaluation pending pt progress.   In Progress     FOLLOW UP  Follow Up Needed (Documentation Required): Yes  SLP Follow-up Date: 09/08/22  Number of Visits to Meet Established Goals:  (2-3)    Session: 1    If you have any questions, please contact MARINA Ortiz

## 2022-09-07 NOTE — PROGRESS NOTES
[facility] Dosing Service    Initial Pharmacokinetic Consult for Vancomycin AUC Dosing    Shadi Gu is a 80year old patient who is being treated for pneumonia. Pharmacy has been asked to dose vancomycin by Dr. Joseline Schaffer pending sensitivities of staph aureus in sputum cx. Weights:  Ideal body weight: 66.1 kg (145 lb 11.6 oz)  Actual weight:  63.8 kg (140 lb 10.5 oz)    Labs:  Lab Results   Component Value Date    CREATSERUM 0.68 09/07/2022      CrCl:  Estimated Creatinine Clearance: 79.5 mL/min (A) (based on SCr of 0.68 mg/dL (L)). Based on the above:    1. This patient will receive a loading dose of Vancomycin 1500 mg IVPB (25mg/kg) x 1 dose. 2. Vancomycin peak and trough will be obtained prior to the next dose, in order to calculate AUC24. Goal AUC24 is 400-600 mg-h/L.    3. Pharmacy will order SCr as clinically indicated while on vancomycin to assess renal function. 4. Pharmacy will follow and monitor renal function, toxicity and efficacy. We appreciate the opportunity to assist in the care of this patient.     Jus Roberson, PharmD  9/7/2022  2:44 PM  [address] [place] [address] Extension: [phone]

## 2022-09-07 NOTE — PLAN OF CARE
Received at 2037 after briefly being on PMU, is alert to drowsy. Makes short conversational phrases, answers some simple questions. Later he states \"So, I came back here because I had a small\" and he stops talking. I ask multiple times what he had and finally he states \"tunic, does that make sense? \". After a minute he states \"Stroke!, I had a stroke! \". This is the first time he's been verbal enough for me to see his word finding difficulty. The fact that he retained this information plus that he knows he moved rooms and came back is a big improvement. A few minutes later was picking at EKG leads. States it's Sept 2022 but when I ask where we are, he states \"[place]\" after stating he's in a hospital.  Again placed on nasal cannula due to desat while awake (presently CPAP remains in room from before transfer). Passed bedside swallow earlier today but there is still some concern of aspiration. Denies pain, nausea and dyspnea. Right side slightly weaker than left, fairly strong right hand grasp but very weak shoulder movement. Does not open right eye fully. Tremors are almost completely gone. NSR. Right side with crackles and is diminished posteriorly. Left clear and diminished. Pulses +2 radial and +1 pedals, no edema. Soles of feet red, scab on right great toe, arms bruised (frequent falls at home) with small bandaids around each elbow. SCD's remain intact over PVD-like discolored legs. Primo fit external male catheter was leaking and diaper soiled - changed. Was to go to CTU 7 but no beds were available, awaiting bed. Spouse Peg came over and has been updated. See assessments and flowsheets for further data.

## 2022-09-08 ENCOUNTER — APPOINTMENT (OUTPATIENT)
Dept: GENERAL RADIOLOGY | Facility: HOSPITAL | Age: 82
DRG: 853 | End: 2022-09-08
Attending: INTERNAL MEDICINE
Payer: MEDICARE

## 2022-09-08 LAB
ALBUMIN SERPL-MCNC: 2 G/DL (ref 3.4–5)
ALBUMIN/GLOB SERPL: 0.6 {RATIO} (ref 1–2)
ALP LIVER SERPL-CCNC: 62 U/L
ALT SERPL-CCNC: 16 U/L
ANION GAP SERPL CALC-SCNC: 5 MMOL/L (ref 0–18)
AST SERPL-CCNC: 13 U/L (ref 15–37)
BASOPHILS # BLD AUTO: 0.02 X10(3) UL (ref 0–0.2)
BASOPHILS NFR BLD AUTO: 0.3 %
BILIRUB SERPL-MCNC: 0.9 MG/DL (ref 0.1–2)
BUN BLD-MCNC: 25 MG/DL (ref 7–18)
CALCIUM BLD-MCNC: 7.9 MG/DL (ref 8.5–10.1)
CHLORIDE SERPL-SCNC: 104 MMOL/L (ref 98–112)
CO2 SERPL-SCNC: 27 MMOL/L (ref 21–32)
CREAT BLD-MCNC: 0.74 MG/DL
EOSINOPHIL # BLD AUTO: 0 X10(3) UL (ref 0–0.7)
EOSINOPHIL NFR BLD AUTO: 0 %
ERYTHROCYTE [DISTWIDTH] IN BLOOD BY AUTOMATED COUNT: 13.7 %
GFR SERPLBLD BASED ON 1.73 SQ M-ARVRAT: 92 ML/MIN/1.73M2 (ref 60–?)
GLOBULIN PLAS-MCNC: 3.3 G/DL (ref 2.8–4.4)
GLUCOSE BLD-MCNC: 161 MG/DL (ref 70–99)
GLUCOSE BLD-MCNC: 174 MG/DL (ref 70–99)
GLUCOSE BLD-MCNC: 223 MG/DL (ref 70–99)
GLUCOSE BLD-MCNC: 296 MG/DL (ref 70–99)
GLUCOSE BLD-MCNC: 363 MG/DL (ref 70–99)
HCT VFR BLD AUTO: 39.4 %
HGB BLD-MCNC: 12.7 G/DL
IMM GRANULOCYTES # BLD AUTO: 0.07 X10(3) UL (ref 0–1)
IMM GRANULOCYTES NFR BLD: 1.1 %
LYMPHOCYTES # BLD AUTO: 0.51 X10(3) UL (ref 1–4)
LYMPHOCYTES NFR BLD AUTO: 8.3 %
MAGNESIUM SERPL-MCNC: 2 MG/DL (ref 1.6–2.6)
MCH RBC QN AUTO: 32.5 PG (ref 26–34)
MCHC RBC AUTO-ENTMCNC: 32.2 G/DL (ref 31–37)
MCV RBC AUTO: 100.8 FL
MONOCYTES # BLD AUTO: 0.43 X10(3) UL (ref 0.1–1)
MONOCYTES NFR BLD AUTO: 7 %
NEUTROPHILS # BLD AUTO: 5.09 X10 (3) UL (ref 1.5–7.7)
NEUTROPHILS # BLD AUTO: 5.09 X10(3) UL (ref 1.5–7.7)
NEUTROPHILS NFR BLD AUTO: 83.3 %
OSMOLALITY SERPL CALC.SUM OF ELEC: 291 MOSM/KG (ref 275–295)
PHOSPHATE SERPL-MCNC: 2.6 MG/DL (ref 2.5–4.9)
PLATELET # BLD AUTO: 160 10(3)UL (ref 150–450)
POTASSIUM SERPL-SCNC: 3.7 MMOL/L (ref 3.5–5.1)
POTASSIUM SERPL-SCNC: 3.7 MMOL/L (ref 3.5–5.1)
PROT SERPL-MCNC: 5.3 G/DL (ref 6.4–8.2)
RBC # BLD AUTO: 3.91 X10(6)UL
SODIUM SERPL-SCNC: 136 MMOL/L (ref 136–145)
VANCOMYCIN SERPL-MCNC: 9.3 UG/ML (ref ?–40)
WBC # BLD AUTO: 6.1 X10(3) UL (ref 4–11)

## 2022-09-08 PROCEDURE — 71045 X-RAY EXAM CHEST 1 VIEW: CPT | Performed by: INTERNAL MEDICINE

## 2022-09-08 PROCEDURE — 99291 CRITICAL CARE FIRST HOUR: CPT | Performed by: OTHER

## 2022-09-08 RX ORDER — FOLIC ACID 1 MG/1
1 TABLET ORAL DAILY
Status: DISCONTINUED | OUTPATIENT
Start: 2022-09-08 | End: 2022-09-30

## 2022-09-08 RX ORDER — MULTIPLE VITAMINS W/ MINERALS TAB 9MG-400MCG
1 TAB ORAL DAILY
Status: DISCONTINUED | OUTPATIENT
Start: 2022-09-08 | End: 2022-09-21 | Stop reason: SDUPTHER

## 2022-09-08 RX ORDER — PREDNISONE 20 MG/1
20 TABLET ORAL
Status: DISCONTINUED | OUTPATIENT
Start: 2022-09-09 | End: 2022-09-10

## 2022-09-08 RX ORDER — VANCOMYCIN HYDROCHLORIDE
1250 EVERY 12 HOURS
Status: DISCONTINUED | OUTPATIENT
Start: 2022-09-08 | End: 2022-09-10

## 2022-09-08 RX ORDER — POTASSIUM CHLORIDE 14.9 MG/ML
20 INJECTION INTRAVENOUS ONCE
Status: DISCONTINUED | OUTPATIENT
Start: 2022-09-08 | End: 2022-09-08

## 2022-09-08 RX ORDER — PANTOPRAZOLE SODIUM 40 MG/1
40 TABLET, DELAYED RELEASE ORAL
Status: DISCONTINUED | OUTPATIENT
Start: 2022-09-08 | End: 2022-09-30

## 2022-09-08 RX ORDER — PANTOPRAZOLE SODIUM 40 MG/1
40 TABLET, DELAYED RELEASE ORAL
Status: DISCONTINUED | OUTPATIENT
Start: 2022-09-08 | End: 2022-09-08

## 2022-09-08 RX ORDER — POTASSIUM CHLORIDE 14.9 MG/ML
20 INJECTION INTRAVENOUS ONCE
Status: COMPLETED | OUTPATIENT
Start: 2022-09-08 | End: 2022-09-08

## 2022-09-08 RX ORDER — ASPIRIN 325 MG
325 TABLET ORAL DAILY
Status: DISCONTINUED | OUTPATIENT
Start: 2022-09-08 | End: 2022-09-19 | Stop reason: SDUPTHER

## 2022-09-08 RX ORDER — MELATONIN
100 DAILY
Status: DISCONTINUED | OUTPATIENT
Start: 2022-09-08 | End: 2022-09-21 | Stop reason: SDUPTHER

## 2022-09-08 NOTE — PLAN OF CARE
Pt received alert and oriented x3-4, delayed responses at times. Q4h neuro checks. On room air while awake, CPAP at night. CTA head/neck completed. External urinary catheter intact. Transfer orders in place. Call light within reach. Isolation and fall precautions maintained.

## 2022-09-08 NOTE — PLAN OF CARE
Pt received after change of shift report. Pt alert and oriented to person, time and situation. Following commands. No reports of pain. Pupils equal and reactive. Neuro assessments Q4 (see Flowsheets). Pt received on room air. Oxygen saturation stable. Non-productive cough noted. Afebrile. Sinus rhythm on telemetry strip. Blood pressure stable. Regular diet in place. No BM's. Accuchecks QID. External cath in place with output. Transfer orders in place. Family at bedside and updated on pt status and plan of care.

## 2022-09-08 NOTE — CM/SW NOTE
AUSTIN spoke to wife today re: therapy recs for SORIN and she is in agreement. Pt is COVID + as of 9/1. Will need facility that can accept COVID if dc'd before 9/11. [facility] referrals in [place]. AUSTIN will email wife S[facility] list once responses received  [email or web address]     Blaire Reina LCSW  /Discharge Planner  [phone]

## 2022-09-08 NOTE — SLP NOTE
SPEECH/LANGUAGE/COGNITIVE EVALUATION - INPATIENT    Admission Date: 9/1/2022  Evaluation Date: 09/08/22    Reason for Referral: R/O aspiration    ASSESSMENT & PLAN   ASSESSMENT & IMPRESSION  Patient seen today for ongoing assessment of diet tolerance and to complete cognitive-linguistic evaluation. Patient denied dysphagia symptoms with PO intake and reported improved appetite. SLP observed PO trials of thin liquids and regular solids. Patient remains impulsive with fast rate of intake when drinking. Education provided re: importance of safe swallowing precautions. NO overt s/s of aspiration observed. Recommend patient continue a regular diet and thin liquids. Patient initially denied changes to cognition. 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 SLUMS completed with a score of 15 indicated significant cognitive-linguistic deficits. Domains of greatest deficits include attention, immediate recall, and delayed recall. Patient able to recall 3/5 objects without delayed and only 1/5 objects with brief delay. Patient was unable to retain information from a short paragraph read aloud to him. Patient would benefit from continued SLP services targeting identified deficits. Education provided re: results and recommendations. Assessment(s) Administered: SLUMS                Aphasic Depression Rating Scale Administered: Not applicable  Deficits Identified: Attention concentration;Memory    Discharge Recommendations/Plan: Sub-acute rehabilitation    Patient Experiencing Pain: No                           GOALS  Goal #1 The patient will tolerate regular consistency and thin liquids without overt signs or symptoms of aspiration with 90 % accuracy over 1-2 session(s). In Progress   Goal #2 The patient/family/caregiver will demonstrate understanding and implementation of aspiration precautions and swallow strategies independently over 1-2 session(s).     In Progress   Goal #3 Cognitive communication evaluation pending pt progress  Met   Goal #4 The patient will comprehend paragraph information as per [facility] questions with 80 % accuracy within mild cues within   5 session(s). In Progress   Goal #5 Patient will compelte immediate recall tasks with 80% accuracy when provided mild cues within 5 sessions. In Progress   Goal #6 Patient will compelte delayed recall tasks with 80% accuracy when provided mild cues within 5 sessions. In Progress   Prior Living Situation: Home with spouse  Prior Level of Function: Unknown        Interdisciplinary Communication: Discussed with RN    Patient, family and/or caregiver has been informed and has taken part in this evaluation and plan of treatment and have been advised and agree on the findings and goals. FOLLOW UP  Treatment Plan/Recommendations: Cognitive communication therapy; Aspiration precautions  Number of Visits to Meet Established Goals:  (3-5)  Follow Up Needed (Documentation Required): Yes  SLP Follow-up Date: 09/12/22    Thank you for your referral.  If you have any questions please contact MARINA Nice

## 2022-09-09 ENCOUNTER — APPOINTMENT (OUTPATIENT)
Dept: ULTRASOUND IMAGING | Facility: HOSPITAL | Age: 82
DRG: 853 | End: 2022-09-09
Attending: Other
Payer: MEDICARE

## 2022-09-09 LAB
ALBUMIN SERPL-MCNC: 1.9 G/DL (ref 3.4–5)
ALBUMIN/GLOB SERPL: 0.7 {RATIO} (ref 1–2)
ALP LIVER SERPL-CCNC: 61 U/L
ALT SERPL-CCNC: 14 U/L
ANION GAP SERPL CALC-SCNC: 4 MMOL/L (ref 0–18)
AST SERPL-CCNC: 9 U/L (ref 15–37)
BASOPHILS # BLD AUTO: 0.02 X10(3) UL (ref 0–0.2)
BASOPHILS NFR BLD AUTO: 0.3 %
BILIRUB SERPL-MCNC: 0.8 MG/DL (ref 0.1–2)
BUN BLD-MCNC: 19 MG/DL (ref 7–18)
CALCIUM BLD-MCNC: 7.9 MG/DL (ref 8.5–10.1)
CHLORIDE SERPL-SCNC: 105 MMOL/L (ref 98–112)
CO2 SERPL-SCNC: 26 MMOL/L (ref 21–32)
CREAT BLD-MCNC: 0.51 MG/DL
EOSINOPHIL # BLD AUTO: 0.04 X10(3) UL (ref 0–0.7)
EOSINOPHIL NFR BLD AUTO: 0.6 %
ERYTHROCYTE [DISTWIDTH] IN BLOOD BY AUTOMATED COUNT: 13.3 %
GFR SERPLBLD BASED ON 1.73 SQ M-ARVRAT: 103 ML/MIN/1.73M2 (ref 60–?)
GLOBULIN PLAS-MCNC: 2.8 G/DL (ref 2.8–4.4)
GLUCOSE BLD-MCNC: 114 MG/DL (ref 70–99)
GLUCOSE BLD-MCNC: 245 MG/DL (ref 70–99)
GLUCOSE BLD-MCNC: 248 MG/DL (ref 70–99)
GLUCOSE BLD-MCNC: 428 MG/DL (ref 70–99)
GLUCOSE BLD-MCNC: 90 MG/DL (ref 70–99)
HCT VFR BLD AUTO: 35.9 %
HGB BLD-MCNC: 12 G/DL
IMM GRANULOCYTES # BLD AUTO: 0.1 X10(3) UL (ref 0–1)
IMM GRANULOCYTES NFR BLD: 1.5 %
LYMPHOCYTES # BLD AUTO: 0.82 X10(3) UL (ref 1–4)
LYMPHOCYTES NFR BLD AUTO: 12.1 %
MAGNESIUM SERPL-MCNC: 1.9 MG/DL (ref 1.6–2.6)
MCH RBC QN AUTO: 32.5 PG (ref 26–34)
MCHC RBC AUTO-ENTMCNC: 33.4 G/DL (ref 31–37)
MCV RBC AUTO: 97.3 FL
MONOCYTES # BLD AUTO: 0.64 X10(3) UL (ref 0.1–1)
MONOCYTES NFR BLD AUTO: 9.5 %
NEUTROPHILS # BLD AUTO: 5.15 X10 (3) UL (ref 1.5–7.7)
NEUTROPHILS # BLD AUTO: 5.15 X10(3) UL (ref 1.5–7.7)
NEUTROPHILS NFR BLD AUTO: 76 %
OSMOLALITY SERPL CALC.SUM OF ELEC: 282 MOSM/KG (ref 275–295)
PHOSPHATE SERPL-MCNC: 1.6 MG/DL (ref 2.5–4.9)
PLATELET # BLD AUTO: 158 10(3)UL (ref 150–450)
POTASSIUM SERPL-SCNC: 3.2 MMOL/L (ref 3.5–5.1)
POTASSIUM SERPL-SCNC: 3.2 MMOL/L (ref 3.5–5.1)
PROT SERPL-MCNC: 4.7 G/DL (ref 6.4–8.2)
RBC # BLD AUTO: 3.69 X10(6)UL
SODIUM SERPL-SCNC: 135 MMOL/L (ref 136–145)
VANCOMYCIN TROUGH SERPL-MCNC: 16 UG/ML (ref 10–20)
WBC # BLD AUTO: 6.8 X10(3) UL (ref 4–11)

## 2022-09-09 PROCEDURE — 93880 EXTRACRANIAL BILAT STUDY: CPT | Performed by: OTHER

## 2022-09-09 RX ORDER — ROSUVASTATIN CALCIUM 40 MG/1
40 TABLET, COATED ORAL NIGHTLY
Status: DISCONTINUED | OUTPATIENT
Start: 2022-09-09 | End: 2022-09-30

## 2022-09-09 RX ORDER — POTASSIUM CHLORIDE 14.9 MG/ML
20 INJECTION INTRAVENOUS ONCE
Status: COMPLETED | OUTPATIENT
Start: 2022-09-09 | End: 2022-09-09

## 2022-09-09 NOTE — CM/SW NOTE
[facility] list emailed to pt's wife. Await choice. Anticipate pt will be here 10 days post COVID test so hopefully will not need COVID iso.     Blaire Reina, MESFINW  /Discharge Planner  [phone]

## 2022-09-09 NOTE — PLAN OF CARE
Assumed care at change of shift. Pt received alert and oriented x4. Following all commands appropriately. Q4 neuro checks completed. Pt wearing CPAP while asleep. External urinary catheter intact. Call light within reach. Isolation and fall precautions maintained. Transfer orders in place, waiting for bed availability.

## 2022-09-10 PROBLEM — I65.23 CAROTID STENOSIS, BILATERAL: Status: ACTIVE | Noted: 2022-01-01

## 2022-09-10 PROBLEM — I65.23 CAROTID STENOSIS, BILATERAL: Status: ACTIVE | Noted: 2022-09-10

## 2022-09-10 LAB
ALBUMIN SERPL-MCNC: 1.7 G/DL (ref 3.4–5)
ALBUMIN/GLOB SERPL: 0.6 {RATIO} (ref 1–2)
ALP LIVER SERPL-CCNC: 67 U/L
ALT SERPL-CCNC: 20 U/L
ANION GAP SERPL CALC-SCNC: 3 MMOL/L (ref 0–18)
AST SERPL-CCNC: 15 U/L (ref 15–37)
BASOPHILS # BLD AUTO: 0.02 X10(3) UL (ref 0–0.2)
BASOPHILS NFR BLD AUTO: 0.3 %
BILIRUB SERPL-MCNC: 0.6 MG/DL (ref 0.1–2)
BUN BLD-MCNC: 15 MG/DL (ref 7–18)
CALCIUM BLD-MCNC: 7.7 MG/DL (ref 8.5–10.1)
CHLORIDE SERPL-SCNC: 108 MMOL/L (ref 98–112)
CO2 SERPL-SCNC: 26 MMOL/L (ref 21–32)
CREAT BLD-MCNC: 0.51 MG/DL
EOSINOPHIL # BLD AUTO: 0.1 X10(3) UL (ref 0–0.7)
EOSINOPHIL NFR BLD AUTO: 1.4 %
ERYTHROCYTE [DISTWIDTH] IN BLOOD BY AUTOMATED COUNT: 13.3 %
GFR SERPLBLD BASED ON 1.73 SQ M-ARVRAT: 103 ML/MIN/1.73M2 (ref 60–?)
GLOBULIN PLAS-MCNC: 2.8 G/DL (ref 2.8–4.4)
GLUCOSE BLD-MCNC: 113 MG/DL (ref 70–99)
GLUCOSE BLD-MCNC: 182 MG/DL (ref 70–99)
GLUCOSE BLD-MCNC: 253 MG/DL (ref 70–99)
GLUCOSE BLD-MCNC: 318 MG/DL (ref 70–99)
GLUCOSE BLD-MCNC: 56 MG/DL (ref 70–99)
GLUCOSE BLD-MCNC: 59 MG/DL (ref 70–99)
HCT VFR BLD AUTO: 33.6 %
HGB BLD-MCNC: 11.1 G/DL
IMM GRANULOCYTES # BLD AUTO: 0.1 X10(3) UL (ref 0–1)
IMM GRANULOCYTES NFR BLD: 1.4 %
LYMPHOCYTES # BLD AUTO: 0.8 X10(3) UL (ref 1–4)
LYMPHOCYTES NFR BLD AUTO: 10.8 %
MAGNESIUM SERPL-MCNC: 2 MG/DL (ref 1.6–2.6)
MCH RBC QN AUTO: 32.9 PG (ref 26–34)
MCHC RBC AUTO-ENTMCNC: 33 G/DL (ref 31–37)
MCV RBC AUTO: 99.7 FL
MONOCYTES # BLD AUTO: 0.52 X10(3) UL (ref 0.1–1)
MONOCYTES NFR BLD AUTO: 7 %
NEUTROPHILS # BLD AUTO: 5.86 X10 (3) UL (ref 1.5–7.7)
NEUTROPHILS # BLD AUTO: 5.86 X10(3) UL (ref 1.5–7.7)
NEUTROPHILS NFR BLD AUTO: 79.1 %
OSMOLALITY SERPL CALC.SUM OF ELEC: 283 MOSM/KG (ref 275–295)
PHOSPHATE SERPL-MCNC: 1.7 MG/DL (ref 2.5–4.9)
PLATELET # BLD AUTO: 168 10(3)UL (ref 150–450)
POTASSIUM SERPL-SCNC: 3.3 MMOL/L (ref 3.5–5.1)
POTASSIUM SERPL-SCNC: 3.3 MMOL/L (ref 3.5–5.1)
PROT SERPL-MCNC: 4.5 G/DL (ref 6.4–8.2)
RBC # BLD AUTO: 3.37 X10(6)UL
SODIUM SERPL-SCNC: 137 MMOL/L (ref 136–145)
VANCOMYCIN PEAK SERPL-MCNC: 28.4 UG/ML (ref 30–50)
WBC # BLD AUTO: 7.4 X10(3) UL (ref 4–11)

## 2022-09-10 PROCEDURE — 99233 SBSQ HOSP IP/OBS HIGH 50: CPT | Performed by: OTHER

## 2022-09-10 RX ORDER — DEXTROSE MONOHYDRATE 25 G/50ML
50 INJECTION, SOLUTION INTRAVENOUS
Status: DISCONTINUED | OUTPATIENT
Start: 2022-09-10 | End: 2022-09-30

## 2022-09-10 RX ORDER — NICOTINE POLACRILEX 4 MG
15 LOZENGE BUCCAL
Status: DISCONTINUED | OUTPATIENT
Start: 2022-09-10 | End: 2022-09-30

## 2022-09-10 RX ORDER — NICOTINE POLACRILEX 4 MG
30 LOZENGE BUCCAL
Status: DISCONTINUED | OUTPATIENT
Start: 2022-09-10 | End: 2022-09-30

## 2022-09-10 RX ORDER — PREDNISONE 10 MG/1
10 TABLET ORAL
Status: COMPLETED | OUTPATIENT
Start: 2022-09-11 | End: 2022-09-12

## 2022-09-10 RX ORDER — POTASSIUM CHLORIDE 14.9 MG/ML
20 INJECTION INTRAVENOUS ONCE
Status: COMPLETED | OUTPATIENT
Start: 2022-09-10 | End: 2022-09-10

## 2022-09-10 NOTE — PLAN OF CARE
Assumed care of pt for the night shift. Pt resting comfortably in bed. Pt with transfer orders, waiting for bed. See flowsheets for further assessment     Hypoglycemic this morning, [place] aware.  Received orders for hypoglycemia protocol

## 2022-09-10 NOTE — PLAN OF CARE
Pt received after change of shift report. Pt alert and oriented. Following commands. No reports of pain. Pupils equal and reactive. Pt received on room air. Oxygen saturation stable. Afebrile. Sinus rhythm on telemetry strip. Blood pressure stable. Pt working with PT/OT today and up to chair. SCD's on. Regular diet in place. No BM's. External catheter in place with output. Transfer orders in place. Family at bedside and updated on pt status and plan of care.

## 2022-09-10 NOTE — PLAN OF CARE
Pt aox4. Has right arm weakness but no other noted neuro deficits. Has bilateral leg weakness. oob to chair but unable to bear weight, requiring sit to stand to get to chair. Tolerated being in chair for 1.5 hours. On RA. BP maintained w/in goal range per neuro. No s/s etoh w/d. Tolerating regular diet. K replaced per protocol. meds given as ordered. plan for op f/u w/ pulm and vascular sx and eventual dc to [facility]. Awaiting tx to floor.

## 2022-09-11 LAB
ALBUMIN SERPL-MCNC: 1.6 G/DL (ref 3.4–5)
ALBUMIN/GLOB SERPL: 0.6 {RATIO} (ref 1–2)
ALP LIVER SERPL-CCNC: 376 U/L
ALT SERPL-CCNC: 241 U/L
ANION GAP SERPL CALC-SCNC: 3 MMOL/L (ref 0–18)
AST SERPL-CCNC: 322 U/L (ref 15–37)
BASOPHILS # BLD AUTO: 0.01 X10(3) UL (ref 0–0.2)
BASOPHILS NFR BLD AUTO: 0.1 %
BILIRUB SERPL-MCNC: 1.9 MG/DL (ref 0.1–2)
BUN BLD-MCNC: 13 MG/DL (ref 7–18)
CALCIUM BLD-MCNC: 7.8 MG/DL (ref 8.5–10.1)
CHLORIDE SERPL-SCNC: 110 MMOL/L (ref 98–112)
CO2 SERPL-SCNC: 22 MMOL/L (ref 21–32)
CREAT BLD-MCNC: 0.54 MG/DL
EOSINOPHIL # BLD AUTO: 0.07 X10(3) UL (ref 0–0.7)
EOSINOPHIL NFR BLD AUTO: 0.6 %
ERYTHROCYTE [DISTWIDTH] IN BLOOD BY AUTOMATED COUNT: 13.5 %
GFR SERPLBLD BASED ON 1.73 SQ M-ARVRAT: 101 ML/MIN/1.73M2 (ref 60–?)
GLOBULIN PLAS-MCNC: 2.9 G/DL (ref 2.8–4.4)
GLUCOSE BLD-MCNC: 105 MG/DL (ref 70–99)
GLUCOSE BLD-MCNC: 116 MG/DL (ref 70–99)
GLUCOSE BLD-MCNC: 161 MG/DL (ref 70–99)
GLUCOSE BLD-MCNC: 183 MG/DL (ref 70–99)
GLUCOSE BLD-MCNC: 249 MG/DL (ref 70–99)
HCT VFR BLD AUTO: 34 %
HGB BLD-MCNC: 10.9 G/DL
IMM GRANULOCYTES # BLD AUTO: 0.09 X10(3) UL (ref 0–1)
IMM GRANULOCYTES NFR BLD: 0.8 %
LYMPHOCYTES # BLD AUTO: 0.62 X10(3) UL (ref 1–4)
LYMPHOCYTES NFR BLD AUTO: 5.4 %
MCH RBC QN AUTO: 32 PG (ref 26–34)
MCHC RBC AUTO-ENTMCNC: 32.1 G/DL (ref 31–37)
MCV RBC AUTO: 99.7 FL
MONOCYTES # BLD AUTO: 0.46 X10(3) UL (ref 0.1–1)
MONOCYTES NFR BLD AUTO: 4 %
NEUTROPHILS # BLD AUTO: 10.33 X10 (3) UL (ref 1.5–7.7)
NEUTROPHILS # BLD AUTO: 10.33 X10(3) UL (ref 1.5–7.7)
NEUTROPHILS NFR BLD AUTO: 89.1 %
OSMOLALITY SERPL CALC.SUM OF ELEC: 281 MOSM/KG (ref 275–295)
PHOSPHATE SERPL-MCNC: 1.8 MG/DL (ref 2.5–4.9)
PLATELET # BLD AUTO: 162 10(3)UL (ref 150–450)
POTASSIUM SERPL-SCNC: 3.9 MMOL/L (ref 3.5–5.1)
POTASSIUM SERPL-SCNC: 3.9 MMOL/L (ref 3.5–5.1)
PROT SERPL-MCNC: 4.5 G/DL (ref 6.4–8.2)
RBC # BLD AUTO: 3.41 X10(6)UL
SODIUM SERPL-SCNC: 135 MMOL/L (ref 136–145)
WBC # BLD AUTO: 11.6 X10(3) UL (ref 4–11)

## 2022-09-11 PROCEDURE — 99233 SBSQ HOSP IP/OBS HIGH 50: CPT | Performed by: OTHER

## 2022-09-11 RX ORDER — DOXYCYCLINE HYCLATE 100 MG/1
100 CAPSULE ORAL EVERY 12 HOURS SCHEDULED
Qty: 20 CAPSULE | Refills: 0 | Status: SHIPPED | OUTPATIENT
Start: 2022-09-11 | End: 2022-09-30

## 2022-09-11 RX ORDER — DOXYCYCLINE HYCLATE 100 MG/1
100 CAPSULE ORAL EVERY 12 HOURS SCHEDULED
Status: DISCONTINUED | OUTPATIENT
Start: 2022-09-11 | End: 2022-09-17

## 2022-09-11 NOTE — PLAN OF CARE
Assumed care of pt for the night shift. Pt alert and oriented x4. Pt with transfer orders, waiting for bed. See flowsheets for further assessment.

## 2022-09-11 NOTE — PLAN OF CARE
lft's elevated this morning. abd US ordered. Remains w/ RUE weakness and requires sit to stand to get oob to chair. Up in chair x4 hours this afternoon. covid isolation dc'd. Transitioned to PO abx per ID. BP w/in goal range. Neuro and ID both s/o. Cards to see tomorrow to determine poc and f/u prior to discharge in the next few days.  paged to verify pt could dc from his standpoint and f/u outpatient but stated he wants to see pt prior to dc and will see pt tomorrow. Pt and wife updated on poc.

## 2022-09-12 ENCOUNTER — APPOINTMENT (OUTPATIENT)
Dept: ULTRASOUND IMAGING | Facility: HOSPITAL | Age: 82
DRG: 853 | End: 2022-09-12
Attending: INTERNAL MEDICINE
Payer: MEDICARE

## 2022-09-12 LAB
ALBUMIN SERPL-MCNC: 1.8 G/DL (ref 3.4–5)
ALBUMIN/GLOB SERPL: 0.5 {RATIO} (ref 1–2)
ALP LIVER SERPL-CCNC: 383 U/L
ALT SERPL-CCNC: 278 U/L
ANION GAP SERPL CALC-SCNC: 5 MMOL/L (ref 0–18)
AST SERPL-CCNC: 160 U/L (ref 15–37)
BASOPHILS # BLD AUTO: 0.02 X10(3) UL (ref 0–0.2)
BASOPHILS NFR BLD AUTO: 0.2 %
BILIRUB SERPL-MCNC: 1.2 MG/DL (ref 0.1–2)
BUN BLD-MCNC: 18 MG/DL (ref 7–18)
CALCIUM BLD-MCNC: 8.3 MG/DL (ref 8.5–10.1)
CHLORIDE SERPL-SCNC: 109 MMOL/L (ref 98–112)
CO2 SERPL-SCNC: 22 MMOL/L (ref 21–32)
CREAT BLD-MCNC: 1.16 MG/DL
EOSINOPHIL # BLD AUTO: 0.02 X10(3) UL (ref 0–0.7)
EOSINOPHIL NFR BLD AUTO: 0.2 %
ERYTHROCYTE [DISTWIDTH] IN BLOOD BY AUTOMATED COUNT: 13.7 %
GFR SERPLBLD BASED ON 1.73 SQ M-ARVRAT: 64 ML/MIN/1.73M2 (ref 60–?)
GLOBULIN PLAS-MCNC: 3.5 G/DL (ref 2.8–4.4)
GLUCOSE BLD-MCNC: 108 MG/DL (ref 70–99)
GLUCOSE BLD-MCNC: 119 MG/DL (ref 70–99)
GLUCOSE BLD-MCNC: 148 MG/DL (ref 70–99)
GLUCOSE BLD-MCNC: 302 MG/DL (ref 70–99)
GLUCOSE BLD-MCNC: 97 MG/DL (ref 70–99)
HCT VFR BLD AUTO: 37.3 %
HGB BLD-MCNC: 12.4 G/DL
IMM GRANULOCYTES # BLD AUTO: 0.11 X10(3) UL (ref 0–1)
IMM GRANULOCYTES NFR BLD: 1 %
LYMPHOCYTES # BLD AUTO: 0.56 X10(3) UL (ref 1–4)
LYMPHOCYTES NFR BLD AUTO: 5.2 %
MCH RBC QN AUTO: 33.1 PG (ref 26–34)
MCHC RBC AUTO-ENTMCNC: 33.2 G/DL (ref 31–37)
MCV RBC AUTO: 99.5 FL
MONOCYTES # BLD AUTO: 0.46 X10(3) UL (ref 0.1–1)
MONOCYTES NFR BLD AUTO: 4.2 %
NEUTROPHILS # BLD AUTO: 9.68 X10 (3) UL (ref 1.5–7.7)
NEUTROPHILS # BLD AUTO: 9.68 X10(3) UL (ref 1.5–7.7)
NEUTROPHILS NFR BLD AUTO: 89.2 %
OSMOLALITY SERPL CALC.SUM OF ELEC: 285 MOSM/KG (ref 275–295)
PHOSPHATE SERPL-MCNC: 3.2 MG/DL (ref 2.5–4.9)
PLATELET # BLD AUTO: 190 10(3)UL (ref 150–450)
POTASSIUM SERPL-SCNC: 4.4 MMOL/L (ref 3.5–5.1)
PROT SERPL-MCNC: 5.3 G/DL (ref 6.4–8.2)
RBC # BLD AUTO: 3.75 X10(6)UL
SODIUM SERPL-SCNC: 136 MMOL/L (ref 136–145)
WBC # BLD AUTO: 10.9 X10(3) UL (ref 4–11)

## 2022-09-12 PROCEDURE — 76705 ECHO EXAM OF ABDOMEN: CPT | Performed by: INTERNAL MEDICINE

## 2022-09-12 RX ORDER — METOPROLOL TARTRATE 50 MG/1
50 TABLET, FILM COATED ORAL
Status: DISCONTINUED | OUTPATIENT
Start: 2022-09-12 | End: 2022-09-13

## 2022-09-12 NOTE — PROGRESS NOTES
Chart reviewed/ CTA/ US reviewed- will need Left CEA-timing per pulmonary/ cardiology- as he is COVID positive.   Will have cardiac clearance first- assess risk factors

## 2022-09-12 NOTE — CM/SW NOTE
Acute Rehab recommended by Physical therapy. Will hold off on ordering PMR as patient is scheduled to have left Carotid endarterectomy on 2 days. [place] referral initiated in anticipation of Acute Rehab.      Joelle Bauman RN, BSN   [phone]

## 2022-09-12 NOTE — PLAN OF CARE
Assumed care of pt for the night shift. Pt NPO at midnight. Resting comfortably in bed. See foresheets for further assessment. Pt with transfer orders, waiting for bed placement.

## 2022-09-12 NOTE — CONSULTS
[facility]    PATIENT'S NAME: Heather Lindo   ATTENDING PHYSICIAN: Priscilla Ceja DO   CONSULTING PHYSICIAN: Johnny Escalante M.D. PATIENT ACCOUNT#:   [de-identified]    LOCATION:  [address] [place] [address]  MEDICAL RECORD #:   [id number]       YOB: 1940  ADMISSION DATE:       09/01/2022      CONSULT DATE:  09/12/2022    REPORT OF CONSULTATION    REFERRING PHYSICIAN:  Angela Coburn. DO Janine.    HISTORY OF PRESENT ILLNESS:  This is a 65-year-old white male. Consulted by Dr. 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 The patient on Friday, with his wife present at the bedside, started having problems with his right arm, right leg, and having speech with aphasia problems. He was found to have a high-grade left carotid stenosis. He presented with TIA with visual field defect, possible weakness over 1 year ago. He also has a retinal hemorrhage in his left eye. The patient has a long history tobacco abuse but quit smoking. He came into the hospital with some shortness of breath and hypotension, was seen and evaluated. He tested positive for COVID on admission. His wife was COVID positive about 4 to 6 weeks ago with symptoms. He did not have a lot of symptoms related to upper respiratory infection but he was hypotensive with a little shortness of breath. He states at this point he has no chest pain, no shortness of breath. Case was discussed with Dr. Natalee Jurado who has cleared him for surgery stating he is not contagious and his pulmonary status is okay. PAST SURGICAL HISTORY:  He has only had previous surgery for tonsillectomy. MEDICATIONS:  He is on Vibramycin, doxycycline, rosuvastatin, insulin, Protonix, aspirin, metoprolol, hydralazine, amlodipine, subcutaneous Lovenox. He has also been placed on Plavix, rosuvastatin 10 mg a day. ALLERGIES:  Reportedly, he has no known allergies. SOCIAL HISTORY:  The patient is .   He is retired, used to be a . He has children. FAMILY HISTORY:  Coronary disease in his father. REVIEW OF SYSTEMS:   He denies any gangrene, tissue loss, ulceration, rest pain of the lower legs. He has no abdominal pain, back pain, nausea, vomiting, hematemesis, or bright red blood per rectum. He denies any chest pain, shortness of breath, or previous MI. He denies hematuria, dysuria, hemoptysis, cough, sputum production, weight loss. Currently no fever or chills. PHYSICAL EXAMINATION:    GENERAL:  Currently, he is awake, alert. He is appropriate, in no acute distress. VITAL SIGNS:  His blood pressure was 121/60, heart rate 70 to 90, respirations 20. HEENT:  Pupils equal, round. Sclerae clear. Mouth without lesions. NECK:  No cervical bruit. No JVD. LUNGS:  Clear to auscultation. No rales or rhonchi. HEART:  Normal, without a murmur. ABDOMEN:  Soft. No tenderness or masses. EXTREMITIES:  Femoral pulses palpable. Popliteal and pedal pulses may be slightly diminished. Upper extremity pulses palpable. NEUROLOGIC:  Moving the left side without problems. He is weak in his right arm. He cannot hyperabduct on the right shoulder. He still is able to move all of the muscle strength of his fingers and hands with median, ulnar, and radial nerve. Decreased extension of the right elbow. He has a weakness of movement with straight leg raise of the right leg slightly. His speech appears to be normal.    LABORATORY DATA:  His labs were reviewed. His white count was 11.6 yesterday and now it is down to 10.9, hemoglobin 12.4 platelet count 146,450. His creatinine was 0.54 yesterday and is 1.16 with a BUN of 18 today. His labs were noted. Ultrasound of the carotid arteries show significant stenosis of left carotid with peak velocity of 448 cm/second, ratio of 10.15. CT angiogram showed a high-grade left carotid stenosis. Had evidence of a possible left CVA.   He also has stenosis of contralateral carotid artery. He has some centrilobular emphysematous changes in both lung apices. Acute infarct was seen on MRI. IMPRESSION:  Patient with a left CVA, symptoms ongoing for more than 24 hours, with a long history of tobacco abuse, hypertension, dyslipidemia who came in with possible cardiac/pulmonary symptoms, tested positive for COVID, with a high-grade left carotid stenosis. PLAN:  Recommend a left carotid endarterectomy vein patch with the risks and complications including death, myocardial infarction, bleeding, infection, stroke, cranial nerve injury, renal failure, multisystem organ failure, sepsis. Risks and complications of balloon angioplasty and stent were also explained, but due to extensive amount of disease, would do carotid endarterectomy. Texted out to Dr. Jesus Villarreal regarding any possible clotting disorder because he was COVID positive and will have cardiac clearance prior to procedure. A lengthy discussion with the patient and his wife about the risks of death, cardiac complications, bleeding, infection, stroke, cranial nerve injury, renal failure, multisystem organ failure of the open repair, risks and complications of the stent, either TCAR or femoral approach, risks and complications of medical management. They are agreeable. All questions answered.     Dictated By Rin Huff M.D.  d: 09/12/2022 13:20:07  t: 09/12/2022 14:13:00  [facility] [id number]  [facility]/    cc: Rin Huff M.D.

## 2022-09-12 NOTE — PLAN OF CARE
AOx4. Neuro exam unchanged - RUE neglect. RA. AFVSS. Cardiac diet, no caffeine pending stress test. Stress test to be done 9/13 d/t caffeine intake today. Plan for L CEA with Angelina Runner in coming days. Wife and patient updated on plan of care. Questions addressed.

## 2022-09-13 ENCOUNTER — ANESTHESIA EVENT (OUTPATIENT)
Dept: CARDIAC SURGERY | Facility: HOSPITAL | Age: 82
DRG: 853 | End: 2022-09-13
Payer: MEDICARE

## 2022-09-13 ENCOUNTER — APPOINTMENT (OUTPATIENT)
Dept: CV DIAGNOSTICS | Facility: HOSPITAL | Age: 82
DRG: 853 | End: 2022-09-13
Attending: INTERNAL MEDICINE
Payer: MEDICARE

## 2022-09-13 LAB
ALBUMIN SERPL-MCNC: 1.8 G/DL (ref 3.4–5)
ALBUMIN/GLOB SERPL: 0.5 {RATIO} (ref 1–2)
ALP LIVER SERPL-CCNC: 294 U/L
ALT SERPL-CCNC: 163 U/L
ANION GAP SERPL CALC-SCNC: 3 MMOL/L (ref 0–18)
ANTIBODY SCREEN: NEGATIVE
AST SERPL-CCNC: 48 U/L (ref 15–37)
BASOPHILS # BLD AUTO: 0.01 X10(3) UL (ref 0–0.2)
BASOPHILS NFR BLD AUTO: 0.1 %
BILIRUB SERPL-MCNC: 1 MG/DL (ref 0.1–2)
BUN BLD-MCNC: 18 MG/DL (ref 7–18)
CALCIUM BLD-MCNC: 8.3 MG/DL (ref 8.5–10.1)
CHLORIDE SERPL-SCNC: 105 MMOL/L (ref 98–112)
CO2 SERPL-SCNC: 28 MMOL/L (ref 21–32)
CREAT BLD-MCNC: 1.23 MG/DL
EOSINOPHIL # BLD AUTO: 0.04 X10(3) UL (ref 0–0.7)
EOSINOPHIL NFR BLD AUTO: 0.4 %
ERYTHROCYTE [DISTWIDTH] IN BLOOD BY AUTOMATED COUNT: 13.5 %
GFR SERPLBLD BASED ON 1.73 SQ M-ARVRAT: 60 ML/MIN/1.73M2 (ref 60–?)
GLOBULIN PLAS-MCNC: 3.3 G/DL (ref 2.8–4.4)
GLUCOSE BLD-MCNC: 124 MG/DL (ref 70–99)
GLUCOSE BLD-MCNC: 153 MG/DL (ref 70–99)
GLUCOSE BLD-MCNC: 285 MG/DL (ref 70–99)
GLUCOSE BLD-MCNC: 85 MG/DL (ref 70–99)
GLUCOSE BLD-MCNC: 87 MG/DL (ref 70–99)
HCT VFR BLD AUTO: 34.8 %
HGB BLD-MCNC: 11.8 G/DL
IMM GRANULOCYTES # BLD AUTO: 0.07 X10(3) UL (ref 0–1)
IMM GRANULOCYTES NFR BLD: 0.8 %
LYMPHOCYTES # BLD AUTO: 0.65 X10(3) UL (ref 1–4)
LYMPHOCYTES NFR BLD AUTO: 7.1 %
MCH RBC QN AUTO: 33.1 PG (ref 26–34)
MCHC RBC AUTO-ENTMCNC: 33.9 G/DL (ref 31–37)
MCV RBC AUTO: 97.8 FL
MONOCYTES # BLD AUTO: 0.62 X10(3) UL (ref 0.1–1)
MONOCYTES NFR BLD AUTO: 6.7 %
NEUTROPHILS # BLD AUTO: 7.8 X10 (3) UL (ref 1.5–7.7)
NEUTROPHILS # BLD AUTO: 7.8 X10(3) UL (ref 1.5–7.7)
NEUTROPHILS NFR BLD AUTO: 84.9 %
OSMOLALITY SERPL CALC.SUM OF ELEC: 283 MOSM/KG (ref 275–295)
PLATELET # BLD AUTO: 191 10(3)UL (ref 150–450)
POTASSIUM SERPL-SCNC: 3.4 MMOL/L (ref 3.5–5.1)
POTASSIUM SERPL-SCNC: 4.2 MMOL/L (ref 3.5–5.1)
PROT SERPL-MCNC: 5.1 G/DL (ref 6.4–8.2)
RBC # BLD AUTO: 3.56 X10(6)UL
RH BLOOD TYPE: POSITIVE
SODIUM SERPL-SCNC: 136 MMOL/L (ref 136–145)
WBC # BLD AUTO: 9.2 X10(3) UL (ref 4–11)

## 2022-09-13 PROCEDURE — 93018 CV STRESS TEST I&R ONLY: CPT | Performed by: INTERNAL MEDICINE

## 2022-09-13 PROCEDURE — 93017 CV STRESS TEST TRACING ONLY: CPT | Performed by: INTERNAL MEDICINE

## 2022-09-13 PROCEDURE — 78452 HT MUSCLE IMAGE SPECT MULT: CPT | Performed by: INTERNAL MEDICINE

## 2022-09-13 RX ORDER — CEFAZOLIN SODIUM/WATER 2 G/20 ML
2 SYRINGE (ML) INTRAVENOUS ONCE
Status: COMPLETED | OUTPATIENT
Start: 2022-09-13 | End: 2022-09-14

## 2022-09-13 RX ORDER — METOPROLOL SUCCINATE 50 MG/1
50 TABLET, EXTENDED RELEASE ORAL
Status: DISCONTINUED | OUTPATIENT
Start: 2022-09-13 | End: 2022-09-19

## 2022-09-13 RX ORDER — POTASSIUM CHLORIDE 20 MEQ/1
40 TABLET, EXTENDED RELEASE ORAL EVERY 4 HOURS
Status: COMPLETED | OUTPATIENT
Start: 2022-09-13 | End: 2022-09-13

## 2022-09-13 NOTE — PROGRESS NOTES
High grade left carotid stenosis- recent CVA-> 48 hours . Plan left CEA/ vein patch tomorrow if all consultants agree- concern for interval CVA.  > 90% stenosis with soft heterogenous thrombus within stenosis

## 2022-09-13 NOTE — PLAN OF CARE
Assumed care of pt at 0730. Patient kept on diet protocol pending stress test in afternoon. No acute neurological changes. Stress test completed, plan for CEA tomorrow. Consent signed and in chart. NPO at midnight.  Pt reports difficult sleeping and would like something to help him rest before his procedure tomorrow

## 2022-09-13 NOTE — PLAN OF CARE
Received pt from ICU at 0120,alert and oriented, RA, vss wnl, RA, no complains of pain or discomfort,NSR on tele, neuro assessment with rt arm weakness, and alexy hand tremors, plan for stress test this morning,discussed with pt, sleeping in a low bed with call light and all necessary item within reach. Problem: SAFETY ADULT - FALL  Goal: Free from fall injury  Description: INTERVENTIONS:  - Assess pt frequently for physical needs  - Identify cognitive and physical deficits and behaviors that affect risk of falls.   - [place] fall precautions as indicated by assessment.  - Educate pt/family on patient safety including physical limitations  - Instruct pt to call for assistance with activity based on assessment  - Modify environment to reduce risk of injury  - Provide assistive devices as appropriate  - Consider OT/PT consult to assist with strengthening/mobility  - Encourage toileting schedule  Outcome: Progressing     Problem: Patient/Family Goals  Goal: Patient/Family Long Term Goal  Description: Patient's Long Term Goal: Discharge with adequate resources    Interventions:  - Follow care plan and collaborate with health care team   - See additional Care Plan goals for specific interventions  Outcome: Progressing  Goal: Patient/Family Short Term Goal  Description: Patient's Short Term Goal:   9/2 NOC: remain free from falls/injuries, manage agitation    Interventions:   - cluster care  -prn ativan  - See additional Care Plan goals for specific interventions  Outcome: Progressing     Problem: CARDIOVASCULAR - ADULT  Goal: Absence of cardiac arrhythmias or at baseline  Description: INTERVENTIONS:  - Continuous cardiac monitoring, monitor vital signs, obtain 12 lead EKG if indicated  - Evaluate effectiveness of antiarrhythmic and heart rate control medications as ordered  - Initiate emergency measures for life threatening arrhythmias  - Monitor electrolytes and administer replacement therapy as ordered  Outcome: Progressing     Problem: METABOLIC/FLUID AND ELECTROLYTES - ADULT  Goal: Electrolytes maintained within normal limits  Description: INTERVENTIONS:  - Monitor labs and rhythm and assess patient for signs and symptoms of electrolyte imbalances  - Administer electrolyte replacement as ordered  - Monitor response to electrolyte replacements, including rhythm and repeat lab results as appropriate  - Fluid restriction as ordered  - Instruct patient on fluid and nutrition restrictions as appropriate  Outcome: Progressing  Goal: Hemodynamic stability and optimal renal function maintained  Description: INTERVENTIONS:  - Monitor labs and assess for signs and symptoms of volume excess or deficit  - Monitor intake, output and patient weight  - Monitor urine specific gravity, serum osmolarity and serum sodium as indicated or ordered  - Monitor response to interventions for patient's volume status, including labs, urine output, blood pressure (other measures as available)  - Encourage oral intake as appropriate  - Instruct patient on fluid and nutrition restrictions as appropriate  Outcome: Progressing     Problem: SKIN/TISSUE INTEGRITY - ADULT  Goal: Skin integrity remains intact  Description: INTERVENTIONS  - Assess and document risk factors for pressure ulcer development  - Assess and document skin integrity  - Monitor for areas of redness and/or skin breakdown  - Initiate interventions, skin care algorithm/standards of care as needed  Outcome: Progressing     Problem: MUSCULOSKELETAL - ADULT  Goal: Return mobility to safest level of function  Description: INTERVENTIONS:  - Assess patient stability and activity tolerance for standing, transferring and ambulating w/ or w/o assistive devices  - Assist with transfers and ambulation using safe patient handling equipment as needed  - Ensure adequate protection for wounds/incisions during mobilization  - Obtain PT/OT consults as needed  - Advance activity as appropriate  - Communicate ordered activity level and limitations with patient/family  Outcome: Progressing     Problem: NEUROLOGICAL - ADULT  Goal: Achieves stable or improved neurological status  Description: INTERVENTIONS  - Assess for and report changes in neurological status  - Initiate measures to prevent increased intracranial pressure  - Maintain blood pressure and fluid volume within ordered parameters to optimize cerebral perfusion and minimize risk of hemorrhage  - Monitor temperature, glucose, and sodium.  Initiate appropriate interventions as ordered  Outcome: Progressing     Problem: Diabetes/Glucose Control  Goal: Glucose maintained within prescribed range  Description: INTERVENTIONS:  - Monitor Blood Glucose as ordered  - Assess for signs and symptoms of hyperglycemia and hypoglycemia  - Administer ordered medications to maintain glucose within target range  - Assess barriers to adequate nutritional intake and initiate nutrition consult as needed  - Instruct patient on self management of diabetes  Outcome: Progressing

## 2022-09-13 NOTE — CM/SW NOTE
POLST form completed and placed on chart for MD to sign. Secure message sent to Dr Marco Antonio Alcala regarding the above. Acute rehab recommended upon discharge from hospital. Patient requesting [facility] in [place]. Gem AVILA notified of the above.      Merissa Wiley, RN, BSN   [phone]

## 2022-09-13 NOTE — BH PROGRESS NOTE
[facility] Resource Referral Counselor Note    Elsy Burdick Patient Status:  Inpatient    1940 MRN [id number]   [facility] 7NE-A Attending John Conner, DO   Hosp Day # 10 PCP Yvrose Panchal MD       S(subjective) Patient denies a history of mental health symptoms. He said, his sleep and eating at home has been fine. Here he states it is hard for him to sleep because he is being woke up a couple times during the night. He admits to drinking about 4-5 oz of gin daily. Chris Pineda states he is not needing any cd treatment. He feels he can stop on his own. Chris Pineda denies any suicidal thoughts. O(objective) The patient is alert and oriented. He appeared a little anxious when answering questions. A(assessment) The phq9 was reviewed. P(plan) The patient declined any cd treatment or mental health referrals.       Andrea Segura RN  2022  12:36 PM

## 2022-09-14 ENCOUNTER — ANESTHESIA (OUTPATIENT)
Dept: CARDIAC SURGERY | Facility: HOSPITAL | Age: 82
DRG: 853 | End: 2022-09-14
Payer: MEDICARE

## 2022-09-14 LAB
APTT PPP: 32.2 SECONDS (ref 23.3–35.6)
BASE EXCESS BLD CALC-SCNC: 2 MMOL/L
CA-I BLD-SCNC: 1.15 MMOL/L (ref 1.12–1.32)
CO2 BLD-SCNC: 28 MMOL/L (ref 22–32)
ERYTHROCYTE [DISTWIDTH] IN BLOOD BY AUTOMATED COUNT: 13.7 %
GLUCOSE BLD-MCNC: 101 MG/DL (ref 70–99)
GLUCOSE BLD-MCNC: 111 MG/DL (ref 70–99)
GLUCOSE BLD-MCNC: 122 MG/DL (ref 70–99)
GLUCOSE BLD-MCNC: 123 MG/DL (ref 70–99)
GLUCOSE BLD-MCNC: 166 MG/DL (ref 70–99)
GLUCOSE BLD-MCNC: 62 MG/DL (ref 70–99)
HCO3 BLD-SCNC: 26.9 MEQ/L
HCT VFR BLD AUTO: 28.5 %
HCT VFR BLD CALC: 31 %
HGB BLD-MCNC: 9.6 G/DL
INR BLD: 1.18 (ref 0.85–1.16)
ISTAT ACTIVATED CLOTTING TIME: 138 SECONDS (ref 74–137)
ISTAT ACTIVATED CLOTTING TIME: 179 SECONDS (ref 74–137)
ISTAT ACTIVATED CLOTTING TIME: 283 SECONDS (ref 74–137)
ISTAT ACTIVATED CLOTTING TIME: 318 SECONDS (ref 74–137)
MCH RBC QN AUTO: 33.2 PG (ref 26–34)
MCHC RBC AUTO-ENTMCNC: 33.7 G/DL (ref 31–37)
MCV RBC AUTO: 98.6 FL
PCO2 BLD: 43.1 MMHG
PH BLD: 7.4 [PH]
PLATELET # BLD AUTO: 159 10(3)UL (ref 150–450)
PO2 BLD: 408 MMHG
POTASSIUM BLD-SCNC: 3.8 MMOL/L (ref 3.6–5.1)
PROTHROMBIN TIME: 15 SECONDS (ref 11.6–14.8)
RBC # BLD AUTO: 2.89 X10(6)UL
SAO2 % BLD: 100 %
SODIUM BLD-SCNC: 136 MMOL/L (ref 136–145)
WBC # BLD AUTO: 7.6 X10(3) UL (ref 4–11)

## 2022-09-14 PROCEDURE — 03UJ07Z SUPPLEMENT LEFT COMMON CAROTID ARTERY WITH AUTOLOGOUS TISSUE SUBSTITUTE, OPEN APPROACH: ICD-10-PCS | Performed by: SURGERY

## 2022-09-14 PROCEDURE — 03CL0ZZ EXTIRPATION OF MATTER FROM LEFT INTERNAL CAROTID ARTERY, OPEN APPROACH: ICD-10-PCS | Performed by: SURGERY

## 2022-09-14 PROCEDURE — 03UL07Z SUPPLEMENT LEFT INTERNAL CAROTID ARTERY WITH AUTOLOGOUS TISSUE SUBSTITUTE, OPEN APPROACH: ICD-10-PCS | Performed by: SURGERY

## 2022-09-14 PROCEDURE — 76942 ECHO GUIDE FOR BIOPSY: CPT | Performed by: ANESTHESIOLOGY

## 2022-09-14 PROCEDURE — 06BP0ZZ EXCISION OF RIGHT SAPHENOUS VEIN, OPEN APPROACH: ICD-10-PCS | Performed by: SURGERY

## 2022-09-14 PROCEDURE — 03CJ0ZZ EXTIRPATION OF MATTER FROM LEFT COMMON CAROTID ARTERY, OPEN APPROACH: ICD-10-PCS | Performed by: SURGERY

## 2022-09-14 PROCEDURE — 03CN0ZZ EXTIRPATION OF MATTER FROM LEFT EXTERNAL CAROTID ARTERY, OPEN APPROACH: ICD-10-PCS | Performed by: SURGERY

## 2022-09-14 PROCEDURE — 03UN07Z SUPPLEMENT LEFT EXTERNAL CAROTID ARTERY WITH AUTOLOGOUS TISSUE SUBSTITUTE, OPEN APPROACH: ICD-10-PCS | Performed by: SURGERY

## 2022-09-14 RX ORDER — HEPARIN SODIUM 1000 [USP'U]/ML
INJECTION, SOLUTION INTRAVENOUS; SUBCUTANEOUS AS NEEDED
Status: DISCONTINUED | OUTPATIENT
Start: 2022-09-14 | End: 2022-09-14 | Stop reason: SURG

## 2022-09-14 RX ORDER — ROSUVASTATIN CALCIUM 20 MG/1
20 TABLET, COATED ORAL NIGHTLY
Status: DISCONTINUED | OUTPATIENT
Start: 2022-09-14 | End: 2022-09-14

## 2022-09-14 RX ORDER — DIPHENHYDRAMINE HYDROCHLORIDE 50 MG/ML
12.5 INJECTION INTRAMUSCULAR; INTRAVENOUS AS NEEDED
Status: ACTIVE | OUTPATIENT
Start: 2022-09-14 | End: 2022-09-14

## 2022-09-14 RX ORDER — AMLODIPINE BESYLATE 5 MG/1
5 TABLET ORAL DAILY
Status: DISCONTINUED | OUTPATIENT
Start: 2022-09-14 | End: 2022-09-14

## 2022-09-14 RX ORDER — BUPIVACAINE HYDROCHLORIDE 5 MG/ML
INJECTION, SOLUTION EPIDURAL; INTRACAUDAL AS NEEDED
Status: DISCONTINUED | OUTPATIENT
Start: 2022-09-14 | End: 2022-09-14 | Stop reason: HOSPADM

## 2022-09-14 RX ORDER — ASPIRIN 81 MG/1
81 TABLET ORAL DAILY
Status: DISCONTINUED | OUTPATIENT
Start: 2022-09-14 | End: 2022-09-14

## 2022-09-14 RX ORDER — NALOXONE HYDROCHLORIDE 0.4 MG/ML
80 INJECTION, SOLUTION INTRAMUSCULAR; INTRAVENOUS; SUBCUTANEOUS AS NEEDED
Status: ACTIVE | OUTPATIENT
Start: 2022-09-14 | End: 2022-09-14

## 2022-09-14 RX ORDER — CLOPIDOGREL BISULFATE 75 MG/1
75 TABLET ORAL DAILY
Status: DISCONTINUED | OUTPATIENT
Start: 2022-09-15 | End: 2022-09-30

## 2022-09-14 RX ORDER — ACETAMINOPHEN 325 MG/1
650 TABLET ORAL EVERY 4 HOURS PRN
Status: DISCONTINUED | OUTPATIENT
Start: 2022-09-14 | End: 2022-09-30

## 2022-09-14 RX ORDER — PHENYLEPHRINE HCL 10 MG/ML
VIAL (ML) INJECTION AS NEEDED
Status: DISCONTINUED | OUTPATIENT
Start: 2022-09-14 | End: 2022-09-14 | Stop reason: SURG

## 2022-09-14 RX ORDER — MORPHINE SULFATE 2 MG/ML
2 INJECTION, SOLUTION INTRAMUSCULAR; INTRAVENOUS EVERY 2 HOUR PRN
Status: DISCONTINUED | OUTPATIENT
Start: 2022-09-14 | End: 2022-09-30

## 2022-09-14 RX ORDER — POLYETHYLENE GLYCOL 3350 17 G/17G
17 POWDER, FOR SOLUTION ORAL DAILY PRN
Status: DISCONTINUED | OUTPATIENT
Start: 2022-09-14 | End: 2022-09-30

## 2022-09-14 RX ORDER — PROTAMINE SULFATE 10 MG/ML
INJECTION, SOLUTION INTRAVENOUS AS NEEDED
Status: DISCONTINUED | OUTPATIENT
Start: 2022-09-14 | End: 2022-09-14 | Stop reason: SURG

## 2022-09-14 RX ORDER — METOCLOPRAMIDE HYDROCHLORIDE 5 MG/ML
10 INJECTION INTRAMUSCULAR; INTRAVENOUS ONCE AS NEEDED
Status: ACTIVE | OUTPATIENT
Start: 2022-09-14 | End: 2022-09-14

## 2022-09-14 RX ORDER — SODIUM CHLORIDE, SODIUM LACTATE, POTASSIUM CHLORIDE, CALCIUM CHLORIDE 600; 310; 30; 20 MG/100ML; MG/100ML; MG/100ML; MG/100ML
INJECTION, SOLUTION INTRAVENOUS CONTINUOUS
Status: DISCONTINUED | OUTPATIENT
Start: 2022-09-14 | End: 2022-09-18

## 2022-09-14 RX ORDER — MIDAZOLAM HYDROCHLORIDE 1 MG/ML
INJECTION INTRAMUSCULAR; INTRAVENOUS AS NEEDED
Status: DISCONTINUED | OUTPATIENT
Start: 2022-09-14 | End: 2022-09-14 | Stop reason: SURG

## 2022-09-14 RX ORDER — NITROGLYCERIN 20 MG/100ML
INJECTION INTRAVENOUS
Status: DISCONTINUED | OUTPATIENT
Start: 2022-09-14 | End: 2022-09-24

## 2022-09-14 RX ORDER — CEFAZOLIN SODIUM/WATER 2 G/20 ML
2 SYRINGE (ML) INTRAVENOUS EVERY 8 HOURS
Status: COMPLETED | OUTPATIENT
Start: 2022-09-14 | End: 2022-09-15

## 2022-09-14 RX ORDER — ROCURONIUM BROMIDE 10 MG/ML
INJECTION, SOLUTION INTRAVENOUS AS NEEDED
Status: DISCONTINUED | OUTPATIENT
Start: 2022-09-14 | End: 2022-09-14 | Stop reason: SURG

## 2022-09-14 RX ORDER — MORPHINE SULFATE 2 MG/ML
1 INJECTION, SOLUTION INTRAMUSCULAR; INTRAVENOUS EVERY 2 HOUR PRN
Status: DISCONTINUED | OUTPATIENT
Start: 2022-09-14 | End: 2022-09-30

## 2022-09-14 RX ORDER — ONDANSETRON 2 MG/ML
4 INJECTION INTRAMUSCULAR; INTRAVENOUS ONCE AS NEEDED
Status: ACTIVE | OUTPATIENT
Start: 2022-09-14 | End: 2022-09-14

## 2022-09-14 RX ORDER — HYDROCODONE BITARTRATE AND ACETAMINOPHEN 5; 325 MG/1; MG/1
1 TABLET ORAL EVERY 4 HOURS PRN
Status: DISCONTINUED | OUTPATIENT
Start: 2022-09-14 | End: 2022-09-30

## 2022-09-14 RX ORDER — ONDANSETRON 2 MG/ML
4 INJECTION INTRAMUSCULAR; INTRAVENOUS EVERY 6 HOURS PRN
Status: DISCONTINUED | OUTPATIENT
Start: 2022-09-14 | End: 2022-09-30

## 2022-09-14 RX ORDER — BISACODYL 10 MG
10 SUPPOSITORY, RECTAL RECTAL
Status: DISCONTINUED | OUTPATIENT
Start: 2022-09-14 | End: 2022-09-30

## 2022-09-14 RX ORDER — HYDROCODONE BITARTRATE AND ACETAMINOPHEN 5; 325 MG/1; MG/1
2 TABLET ORAL EVERY 4 HOURS PRN
Status: DISCONTINUED | OUTPATIENT
Start: 2022-09-14 | End: 2022-09-30

## 2022-09-14 RX ORDER — MEPERIDINE HYDROCHLORIDE 25 MG/ML
12.5 INJECTION INTRAMUSCULAR; INTRAVENOUS; SUBCUTANEOUS AS NEEDED
Status: DISCONTINUED | OUTPATIENT
Start: 2022-09-14 | End: 2022-09-30

## 2022-09-14 RX ORDER — MIDAZOLAM HYDROCHLORIDE 1 MG/ML
1 INJECTION INTRAMUSCULAR; INTRAVENOUS EVERY 5 MIN PRN
Status: ACTIVE | OUTPATIENT
Start: 2022-09-14 | End: 2022-09-14

## 2022-09-14 RX ORDER — SENNOSIDES 8.6 MG
17.2 TABLET ORAL NIGHTLY PRN
Status: DISCONTINUED | OUTPATIENT
Start: 2022-09-14 | End: 2022-09-30

## 2022-09-14 RX ORDER — SODIUM PHOSPHATE, DIBASIC AND SODIUM PHOSPHATE, MONOBASIC 7; 19 G/133ML; G/133ML
1 ENEMA RECTAL ONCE AS NEEDED
Status: DISCONTINUED | OUTPATIENT
Start: 2022-09-14 | End: 2022-09-30

## 2022-09-14 RX ADMIN — ROCURONIUM BROMIDE 50 MG: 10 INJECTION, SOLUTION INTRAVENOUS at 12:21:00

## 2022-09-14 RX ADMIN — HEPARIN SODIUM 1000 UNITS: 1000 INJECTION, SOLUTION INTRAVENOUS; SUBCUTANEOUS at 14:32:00

## 2022-09-14 RX ADMIN — PROTAMINE SULFATE 20 MG: 10 INJECTION, SOLUTION INTRAVENOUS at 14:49:00

## 2022-09-14 RX ADMIN — PHENYLEPHRINE HCL 200 MCG: 10 MG/ML VIAL (ML) INJECTION at 12:25:00

## 2022-09-14 RX ADMIN — PHENYLEPHRINE HCL 200 MCG: 10 MG/ML VIAL (ML) INJECTION at 12:34:00

## 2022-09-14 RX ADMIN — MIDAZOLAM HYDROCHLORIDE 2 MG: 1 INJECTION INTRAMUSCULAR; INTRAVENOUS at 12:15:00

## 2022-09-14 RX ADMIN — ONDANSETRON 4 MG: 2 INJECTION INTRAMUSCULAR; INTRAVENOUS at 14:48:00

## 2022-09-14 RX ADMIN — ROCURONIUM BROMIDE 20 MG: 10 INJECTION, SOLUTION INTRAVENOUS at 13:46:00

## 2022-09-14 RX ADMIN — HEPARIN SODIUM 9000 UNITS: 1000 INJECTION, SOLUTION INTRAVENOUS; SUBCUTANEOUS at 13:46:00

## 2022-09-14 RX ADMIN — CEFAZOLIN SODIUM/WATER 2 G: 2 G/20 ML SYRINGE (ML) INTRAVENOUS at 12:30:00

## 2022-09-14 RX ADMIN — PROTAMINE SULFATE 10 MG: 10 INJECTION, SOLUTION INTRAVENOUS at 15:24:00

## 2022-09-14 NOTE — PROGRESS NOTES
No complaints- still with persistent right arm weakness/ mild right face/ leg. Speech slow- but appropriate.  For surgery today- again risk/ complications explained-death, MI, CVA, cranial nerve injury, renal failure, sepsis, pulmonary complications- high grade soft/ heterogenous plaque left carotid

## 2022-09-14 NOTE — ANESTHESIA PROCEDURE NOTES
Arterial Line  Performed by: Chandler Pimentel MD  Authorized by: Chandler Pimentel MD     General Information and Staff    Procedure Start:   Procedure End: 9/14/2022 12:20 PM  Anesthesiologist:  Chandler Pimentel MD  Performed By:  Anesthesiologist  Patient Location:  OR  Indication: continuous blood pressure monitoring and blood sampling needed    Site Identification: real time ultrasound guided, surface landmarks and image stored and retrievable    Preanesthetic Checklist: 2 patient identifiers, IV checked, risks and benefits discussed, monitors and equipment checked, pre-op evaluation, timeout performed, anesthesia consent and sterile technique used    Procedure Details    Catheter Size:  20 G  Catheter Length:  1 and 3/4 inchCatheter Type:  Arrow  Seldinger Technique?: Yes    Laterality:  LeftSite:  Radial artery  Site Prep: chlorhexidine  Line Secured:  Wrist Brace, tape and Tegaderm    Assessment    Events: patient tolerated procedure well with no complications      Medications      Additional Comments

## 2022-09-14 NOTE — OCCUPATIONAL THERAPY NOTE
Attempted to see pt for skilled OT services this date. Pt is currently LINDSAY for a L CEA/vein patch. Will re-attempt as schedule allows.  Joel Boyce, 09/14/22, 12:37 PM

## 2022-09-14 NOTE — ANESTHESIA POSTPROCEDURE EVALUATION
[facility] Patient Status:  Inpatient   Age/Gender 80year old male MRN [id number]   [facility] 6[facility]-A Attending Sophia Parrish DO   Hosp Day # 11 PCP Eduardo Kawasaki, MD       Anesthesia Post-op Note    LEFT CAROTID ENDARTERECTOMY WITH RIGHT SAPHENOUS VEIN PATCH    Procedure Summary     Date: 09/14/22 Room / Location: [address] [place] [address] 01 / 3692 [facility]    Anesthesia Start: 6763 Anesthesia Stop:     Procedure: LEFT CAROTID ENDARTERECTOMY WITH RIGHT SAPHENOUS VEIN PATCH (Left ) Diagnosis: (stroke)    Surgeons: Geena Waters MD Anesthesiologist: Nayely Jones MD    Anesthesia Type: general ASA Status: 3          Anesthesia Type: general    Vitals Value Taken Time   /54 09/14/22 1604   Temp 97.9 09/14/22 1604   Pulse 66 09/14/22 1603   Resp 17 09/14/22 1603   SpO2 100 09/14/22 4833   GWVBIU shown include unvalidated device data. Patient Location: ICU    Anesthesia Type: general    Airway Patency: patent and extubated    Postop Pain Control: adequate    Mental Status: mildly sedated but able to meaningfully participate in the post-anesthesia evaluation    Nausea/Vomiting: none    Cardiopulmonary/Hydration status: stable euvolemic    Complications: no apparent anesthesia related complications    Postop vital signs: stable    Dental Exam: Unchanged from Preop    Patient to be transferred to ICU.

## 2022-09-14 NOTE — ANESTHESIA PROCEDURE NOTES
Airway  Date/Time: 9/14/2022 12:23 PM  Urgency: elective      General Information and Staff    Patient location during procedure: OR  Anesthesiologist: Nayely Jones MD  Performed: anesthesiologist     Indications and Patient Condition  Indications for airway management: anesthesia  Spontaneous ventilation: present  Sedation level: moderate (conscious sedation)  Preoxygenated: yes  Patient position: sniffing  Mask difficulty assessment: 1 - vent by mask    Final Airway Details  Final airway type: endotracheal airway      Successful airway: ETT  Cuffed: yes   Successful intubation technique: Video laryngoscopy  Facilitating devices/methods: intubating stylet  Endotracheal tube insertion site: oral  Blade: Gema  Blade size: #3  ETT size (mm): 7.5    Cormack-Lehane Classification: grade IIA - partial view of glottis  Placement verified by: chest auscultation and capnometry   Measured from: lips  ETT to lips (cm): 23  Number of attempts at approach: 1  Number of other approaches attempted: 0

## 2022-09-14 NOTE — SLP NOTE
SLP visit attempted for continued cognitive-linguistic therapy, but patient off unit for procedure. SLP will continue to follow per POC re-attempt as appropriate.

## 2022-09-14 NOTE — PLAN OF CARE
Assumed care at 0700  Patient alert, oriented x4  No acute neurological changes  Denies pain    To CVOR @ 1200.  Spouse and daughter at bedside

## 2022-09-14 NOTE — PLAN OF CARE
Received patient from cardiac operating room at 1600 awake and alert following commands. Slightly weaker on the right side arm and leg at baseline. Dr. Bartolome Ray at bedside. Blood pressure paramenter 150 -100 levophed turned off. Family at bedside discussed plan of care. BS 63 see emar notified Dr. Carlita Corrigan of BS. Taking po. Dressing dry and intact.     Problem: CARDIOVASCULAR - ADULT  Goal: Absence of cardiac arrhythmias or at baseline  Description: INTERVENTIONS:  - Continuous cardiac monitoring, monitor vital signs, obtain 12 lead EKG if indicated  - Evaluate effectiveness of antiarrhythmic and heart rate control medications as ordered  - Initiate emergency measures for life threatening arrhythmias  - Monitor electrolytes and administer replacement therapy as ordered  Outcome: Progressing

## 2022-09-14 NOTE — ANESTHESIA PROCEDURE NOTES
Peripheral IV  Date/Time: 9/14/2022 12:25 PM  Inserted by: Aneudy Roberto MD    Placement  Needle size: 18 G  Laterality: left  Location: forearm  Local anesthetic: none  Site prep: chlorhexidine and alcohol  Technique: anatomical landmarks  Attempts: 1

## 2022-09-14 NOTE — OPERATIVE REPORT
Pre-op Dx: Left carotid stenosis/ Left CVA. Post-op Dx:P Same. Procedurte: Left CEA/ right GSV patch. Surgeon: Claudia/ Asst: Marco Christie. EBL- 250cc. Crystalooid- [id number].

## 2022-09-15 LAB
ALBUMIN SERPL-MCNC: 1.7 G/DL (ref 3.4–5)
ALBUMIN/GLOB SERPL: 0.6 {RATIO} (ref 1–2)
ALP LIVER SERPL-CCNC: 395 U/L
ALT SERPL-CCNC: 84 U/L
ANION GAP SERPL CALC-SCNC: 3 MMOL/L (ref 0–18)
AST SERPL-CCNC: 78 U/L (ref 15–37)
BILIRUB DIRECT SERPL-MCNC: 0.3 MG/DL (ref 0–0.2)
BILIRUB SERPL-MCNC: 0.7 MG/DL (ref 0.1–2)
BUN BLD-MCNC: 13 MG/DL (ref 7–18)
CALCIUM BLD-MCNC: 7.8 MG/DL (ref 8.5–10.1)
CHLORIDE SERPL-SCNC: 109 MMOL/L (ref 98–112)
CO2 SERPL-SCNC: 24 MMOL/L (ref 21–32)
CREAT BLD-MCNC: 1.11 MG/DL
ERYTHROCYTE [DISTWIDTH] IN BLOOD BY AUTOMATED COUNT: 13.6 %
GFR SERPLBLD BASED ON 1.73 SQ M-ARVRAT: 68 ML/MIN/1.73M2 (ref 60–?)
GLOBULIN PLAS-MCNC: 2.8 G/DL (ref 2.8–4.4)
GLUCOSE BLD-MCNC: 102 MG/DL (ref 70–99)
GLUCOSE BLD-MCNC: 104 MG/DL (ref 70–99)
GLUCOSE BLD-MCNC: 107 MG/DL (ref 70–99)
GLUCOSE BLD-MCNC: 214 MG/DL (ref 70–99)
GLUCOSE BLD-MCNC: 220 MG/DL (ref 70–99)
HCT VFR BLD AUTO: 28.5 %
HGB BLD-MCNC: 9.6 G/DL
MCH RBC QN AUTO: 32.7 PG (ref 26–34)
MCHC RBC AUTO-ENTMCNC: 33.7 G/DL (ref 31–37)
MCV RBC AUTO: 96.9 FL
OSMOLALITY SERPL CALC.SUM OF ELEC: 282 MOSM/KG (ref 275–295)
PLATELET # BLD AUTO: 159 10(3)UL (ref 150–450)
POTASSIUM SERPL-SCNC: 3.9 MMOL/L (ref 3.5–5.1)
PROT SERPL-MCNC: 4.5 G/DL (ref 6.4–8.2)
RBC # BLD AUTO: 2.94 X10(6)UL
SODIUM SERPL-SCNC: 136 MMOL/L (ref 136–145)
WBC # BLD AUTO: 9.6 X10(3) UL (ref 4–11)

## 2022-09-15 NOTE — OPERATIVE REPORT
[facility]    PATIENT'S NAME: Monika Garcia   ATTENDING PHYSICIAN: Dann Morrison DO   OPERATING PHYSICIAN: Mary Ocasio M.D. 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 PROCEDURE:  Left carotid endarterectomy with use of a Sundt shunt and right great saphenous vein patch. ASSISTANT:  Marisol Flores SA.    INDICATIONS:  This is a 74-year-old white male who sustained a CVA with right arm weakness, right leg weakness, right facial weakness, had aphasia. He had a duplex ultrasound and CT angiogram which showed a high-grade left carotid artery stenosis. Ratio was over 10, peak velocity of over 450 cm/sec. CT angiogram showed soft heterogeneous plaque at the carotid bifurcation. He was recommended for left carotid endarterectomy and vein patch with the risks and complications including death, myocardial infarction, bleeding, infection, stroke, cranial nerve injury, renal failure, multisystem organ failure. Risks and complications of medical management alone were explained. Risks and complications of a TCAR procedure were also explained. The patient agreed, all questions answered. OPERATIVE TECHNIQUE:  Patient placed supine on the procedure table, underwent general anesthesia, received preoperative antibiotics. Longo catheter placed by Surgery, arterial lines and IV by Anesthesia. He had a roll placed underneath the shoulder blades in order to extend the neck to get the exposure.   Ultrasound suggested a saphenous vein in the right leg but not in the left leg. SCDs on both lower legs. After a time-out was done, incision was made in the right thigh. Dissection brought down to expose the saphenous vein, appeared to be of good quality. The vein was harvested being proximally and distally with 3-0 Ethibond on a needle, trimmed, and prepared for vein patch. Hemostasis was obtained in the thigh wounds and closed in multiple layers with 2-0 Vicryl and 3-0 Vicryl subcuticular. Incision was made, curvilinear, in the left neck. Dissection brought down into the deep neck, exposing the jugular vein which was mobilized laterally. The facial vein, thyroidal vein, and small veins tethering down the hypoglossal nerve were all ligated with 4-0 Ethibond ties, hemoclips, and divided. Care was taken to avoid any trauma to the cranial nerves at the carotid bifurcation. The common carotid artery was identified deep in the neck. Extensive amount of inflammation around the area of the carotid. The patient then had the internal carotid artery identified beyond all disease. The patient had a Springer retractor used for exposure. The patient received a bolus of heparin. After greater than 5-minute period of time with the blood pressure elevated, a Starkey clamp was placed upon the internal carotid artery distally, vessel loop and angled PV clamp on the common carotid proximally, vessel loop on external carotid artery and superior thyroidal artery. Care was taken during this time, since the vagus nerve was adherent to the posterior wall of the carotid bifurcation going upwards, not to traumatize the nerve.   The patient then had arteriotomy performed on the common carotid artery, extended first proximally then distally through a severely stenotic, almost completely occluded carotid bifurcation until normal-appearing intima of the internal carotid artery distal.  An extensive amount of loose debris was seen in the area of the artery. The patient had a heparinized Sundt shunt that was placed, was able to clamp in the middle part as there was a soft area without a stent within it, and I went first distally and had good backbleeding, went proximally with care being taken to prevent any embolization of air or debris. Total clamp time prior to placement of the shunt was approximately 2 to 2-1/2 minutes. With the shunt in place, the endarterectomy was performed. Plaque was sharply transected proximally. It tethered off the internal carotid artery without any difficulty distally, and had an eversion endarterectomy of the external carotid artery without any problem. All loose debris was removed with fine pickups and heparinized saline solution. Tacking was done distally with 6-0 Prolene suture, proximally with 6-0 Prolene suture. Vein patch performed with 6-0 Prolene suture. Prior to completion of vein patch, which was done in multiple suture layers, the patient then had the shunt removed first distally with backbleeding and reapplication of the Starkey clamp, and then proximally with good forward bleeding and reapplication of the angled PV clamp. Backbleeding was allowed from the external carotid artery, and this area was irrigated copiously with heparinized saline solution and suctioned clean. With completion of the anastomosis, backbleeding was allowed from the internal carotid artery to flush out all the air. With completion of the anastomosis and control of the internal carotid bifurcation, flow was established from the common, up the external carotid artery, and after appropriate period of time, up the internal carotid artery. Total clamp time after removal of the shunt was approximately 2 minutes. Hemostasis was obtained with 6-0 and 7-0 Prolene sutures, hemoclips, Bovie coagulation, Gelfoam and thrombin, FloSeal, a small amount of protamine. The patient had ACTs monitored throughout the surgery.   Good Doppler flow appreciated in the internal carotid artery, external carotid artery, and superior thyroidal artery at the end of the procedure. Vital signs remained relatively stable. Once hemostasis was obtained, the wounds were closed in multiple layers with 2-0 Vicryl, skin was closed with 3-0 Vicryl subcuticular. The area was anesthetized with 30 mL of 0.25% Marcaine plain. The thigh wound was closed in multiple layers with 2-0 Vicryl and then 3-0 Vicryl subcuticular. Sponge and instrument counts were correct. The patient received about 800 mL of crystalloid. Blood loss approximately 250 to 300 mL. Pathology specimen included the carotid plaque. FINAL DIAGNOSIS:  High-grade left carotid artery stenosis with recent left cerebrovascular accident, treated with a left carotid endarterectomy, right great saphenous vein patch, and Sundt shunt.     Dictated By Argentina Martin M.D.  d: 09/14/2022 15:36:33  t: 09/14/2022 20:56:40  [facility] [id number]  CZG/    cc: Argentina Martin M.D.

## 2022-09-15 NOTE — PROGRESS NOTES
No complaints- still weak right arm abduction/ right elbow extension- otherwise baseline. Wounds clean/ dry.  Continue Plavix/ ASA/ statin

## 2022-09-15 NOTE — PLAN OF CARE
Assumed care of pt approx 1930. Pt is alert and oriented, MCKINLEY, R side slightly weaker than left. SBP maintained 100-150. Hydralazine PRN x1 dose. Dressings appear c/d/i. Pt does not c/o any pain. Dressings removed and painted with betadine per order. Longo and art line removed per order.

## 2022-09-15 NOTE — CM/SW NOTE
PMR did eval and advises Acute Rehab. [facility] list emailed to wife- she requests [facility]. [facility] will need to get auth. [facility] also emailed [facility] list to wife. [facility] can now accept as long as pt is 20 days out from [facility] [address] [place] have accepted but they are out of network and coverage is 50%. Message left for wife to call [facility].     Glory Jean-Baptiste, MESFINW  /Discharge Planner

## 2022-09-15 NOTE — PLAN OF CARE
Assumed pt care at 0730. Pt a/o x4 and sitting up in the chair. VSS. NSR. SBP maintained 100-150. Pt denies pain. R sided weakness. NIH 1. See flowsheet. L neck steri-strips intact, adela. Site care done. R groin/thigh skin glue intact with bruising. Tolerating diet. PT/OT- pt up to chair with melvi bolden. Pt and wife updated with poc. Transfer orders. Will continue to monitor.

## 2022-09-16 ENCOUNTER — APPOINTMENT (OUTPATIENT)
Dept: GENERAL RADIOLOGY | Facility: HOSPITAL | Age: 82
DRG: 853 | End: 2022-09-16
Attending: INTERNAL MEDICINE
Payer: MEDICARE

## 2022-09-16 LAB
GLUCOSE BLD-MCNC: 123 MG/DL (ref 70–99)
GLUCOSE BLD-MCNC: 135 MG/DL (ref 70–99)
GLUCOSE BLD-MCNC: 167 MG/DL (ref 70–99)
GLUCOSE BLD-MCNC: 173 MG/DL (ref 70–99)

## 2022-09-16 PROCEDURE — 5A0945A ASSISTANCE WITH RESPIRATORY VENTILATION, 24-96 CONSECUTIVE HOURS, HIGH NASAL FLOW/VELOCITY: ICD-10-PCS | Performed by: INTERNAL MEDICINE

## 2022-09-16 PROCEDURE — 71045 X-RAY EXAM CHEST 1 VIEW: CPT | Performed by: INTERNAL MEDICINE

## 2022-09-16 RX ORDER — LOSARTAN POTASSIUM 25 MG/1
25 TABLET ORAL DAILY
Status: DISCONTINUED | OUTPATIENT
Start: 2022-09-16 | End: 2022-09-30

## 2022-09-16 RX ORDER — METOPROLOL TARTRATE 5 MG/5ML
5 INJECTION INTRAVENOUS ONCE AS NEEDED
Status: ACTIVE | OUTPATIENT
Start: 2022-09-16 | End: 2022-09-16

## 2022-09-16 NOTE — PLAN OF CARE
Assumed patient care at 0730. Patient resting in bed, alert/oriented. Room air, congestive cough, denies SOB. Monitor shows sinus rhythm. Surgical sites C/D/I. Tolerating diet. No urine output, bladder scan per protocol. Denies pain. Up with moderate assist and sit to stand. Transfer orders, waiting for bed. Plan of care updated with patient.

## 2022-09-16 NOTE — CM/SW NOTE
Austin spoke to Camille from \[facility]\""- they will start auth for acute. Austin spoke to pt's wife today-she said pt called her to tell her he was bin dc'd on Sunday. AUSTIN explained that pt will not dc until [facility] Lynn has been determined for acute rehab. If denied, explained appeal process. Back up plan is [facility],they will need to go for auth if acute rehab is denied. Anticipate that auth for acute rehab will be pending with [facility] through the weekend.     Rosita Stout, SAÚL  /Discharge Planner

## 2022-09-16 NOTE — PLAN OF CARE
A&Ox4, MCKINLEY, R sided neglect. Normotensive, NSR on tele. Straight cath for urine retention. Will continue current plan of care.

## 2022-09-17 ENCOUNTER — APPOINTMENT (OUTPATIENT)
Dept: ULTRASOUND IMAGING | Facility: HOSPITAL | Age: 82
DRG: 853 | End: 2022-09-17
Attending: SURGERY
Payer: MEDICARE

## 2022-09-17 LAB
ATRIAL RATE: 112 BPM
GLUCOSE BLD-MCNC: 108 MG/DL (ref 70–99)
GLUCOSE BLD-MCNC: 145 MG/DL (ref 70–99)
GLUCOSE BLD-MCNC: 158 MG/DL (ref 70–99)
GLUCOSE BLD-MCNC: 95 MG/DL (ref 70–99)
P AXIS: 99 DEGREES
P-R INTERVAL: 198 MS
Q-T INTERVAL: 324 MS
QRS DURATION: 66 MS
QTC CALCULATION (BEZET): 442 MS
R AXIS: 5 DEGREES
T AXIS: 43 DEGREES
VANCOMYCIN TROUGH SERPL-MCNC: 1.1 UG/ML (ref 10–20)
VENTRICULAR RATE: 112 BPM

## 2022-09-17 PROCEDURE — 93970 EXTREMITY STUDY: CPT | Performed by: SURGERY

## 2022-09-17 RX ORDER — ACETAMINOPHEN 10 MG/ML
1000 INJECTION, SOLUTION INTRAVENOUS EVERY 6 HOURS PRN
Status: DISCONTINUED | OUTPATIENT
Start: 2022-09-17 | End: 2022-09-30

## 2022-09-17 RX ORDER — METOPROLOL TARTRATE 5 MG/5ML
5 INJECTION INTRAVENOUS EVERY 6 HOURS
Status: DISCONTINUED | OUTPATIENT
Start: 2022-09-17 | End: 2022-09-18

## 2022-09-17 RX ORDER — VANCOMYCIN/0.9 % SOD CHLORIDE 1.75 G/5
25 PLASTIC BAG, INJECTION (ML) INTRAVENOUS ONCE
Status: COMPLETED | OUTPATIENT
Start: 2022-09-17 | End: 2022-09-17

## 2022-09-17 NOTE — PROGRESS NOTES
No complaints now- had an aspiration last night- first episode since the surgery. Wife states he has had episodes in the past- appreciate note from Pulmonary, Await Speech Swallow exam. Neuron is at baseline. Cranial nerves intact.  Trachea midline- some ecchymosis

## 2022-09-17 NOTE — PLAN OF CARE
Assumed care of patient at [facility]. Patient AOX4, following commands, and MCKINLEY. Prior to this RN coming on patient was taking a pill with water and started to cough. At that time pulse ox showed o2 sat in the high 80's and patient placed on 3L NC. When this nurse came on patient was drinking water without any coughing, but o2 sats remaining in the high 80's and patient was tachypneic. Oxygen needing to be  Increased and patient was then placed on HF NC. Dr. María Chan notified and orders obtained and carried out. Dr. Bello Later notified, will see patient in am. Dr. Diana Mercedes notified of increased -120's and patient did not received last night scheduled BP meds. Orders for IV Lopressor only if patient converts into A-fib. Patient still unable to void, bladder scan per protocol, and straight cath. POC discussed with patient. For complete assessment see E charting.

## 2022-09-18 ENCOUNTER — APPOINTMENT (OUTPATIENT)
Dept: GENERAL RADIOLOGY | Facility: HOSPITAL | Age: 82
DRG: 853 | End: 2022-09-18
Attending: INTERNAL MEDICINE
Payer: MEDICARE

## 2022-09-18 LAB
CREAT BLD-MCNC: 1.12 MG/DL
GFR SERPLBLD BASED ON 1.73 SQ M-ARVRAT: 67 ML/MIN/1.73M2 (ref 60–?)
GLUCOSE BLD-MCNC: 113 MG/DL (ref 70–99)
GLUCOSE BLD-MCNC: 115 MG/DL (ref 70–99)
GLUCOSE BLD-MCNC: 119 MG/DL (ref 70–99)
GLUCOSE BLD-MCNC: 147 MG/DL (ref 70–99)

## 2022-09-18 PROCEDURE — 71045 X-RAY EXAM CHEST 1 VIEW: CPT | Performed by: INTERNAL MEDICINE

## 2022-09-18 RX ORDER — ENOXAPARIN SODIUM 100 MG/ML
40 INJECTION SUBCUTANEOUS DAILY
Status: DISCONTINUED | OUTPATIENT
Start: 2022-09-19 | End: 2022-09-22

## 2022-09-18 NOTE — PROGRESS NOTES
POD # 4 - Had no problems with apple sauce/ medications this AM- had some problems with breakfast. Notes read from Speech from yesterday. Coughs up some phlegm- had sips of water- still coughs, but no gagging. Will do Video Swallow tomorrow. Patient had aspiration episodes while in ICU earlier after his CVA. Heavy smoker- but he says No tobacco for years. , Had incidental COVID per Pulmonary initial consult on  9/3/22. Neuro at [place] from immediate pre-op. Cranial nerves intact. Trachea midline/ some ecchymosis, but no hematoma. .Notes appreciated from Pulmonary/ ID

## 2022-09-18 NOTE — CM/SW NOTE
Spoke with Jessy Millard at [facility] and confirmed Junito Farrar is still pending at this time.     Feliciano Blanchard, [facility]  Discharge Planner  [phone]

## 2022-09-18 NOTE — PLAN OF CARE
Assumed care of patient at 1. Patient resting in bed, AOX4, following commands, and MCKINLEY. Denies SOB, nausea, dizziness, or pain. Tolerating nectar thick liquids. Straight cath  per urinary retention protocol. POC discussed with patient. For complete assessment see E charting.

## 2022-09-18 NOTE — PLAN OF CARE
Pt on 3L HFNC @ start of shift. Took meds with applesauce with no signs of aspirating, but with breakfast, pt began to cough more and had trouble clearing secretions. Per previous notes, pt made NPO except for meds until video swallow tomorrow. Up to 15L HFNC, NT suctioned once with large amount of thick secretions. Now weaning O2 down, see VS. Up to chair with sit to stand today, mily for 2 hours. Also encouraged pulmonary hygiene every hour. NSR/ST c frequent ectopy and short runs of Afib. Afebrile. [address] for urine retention. Had a BM today. L CEA incision bruised and edematous, but soft to touch and CDI. No c/o pain. Family @ bedside and updated on POC. No other issues at this time.

## 2022-09-19 ENCOUNTER — APPOINTMENT (OUTPATIENT)
Dept: GENERAL RADIOLOGY | Facility: HOSPITAL | Age: 82
DRG: 853 | End: 2022-09-19
Attending: HOSPITALIST
Payer: MEDICARE

## 2022-09-19 ENCOUNTER — APPOINTMENT (OUTPATIENT)
Dept: GENERAL RADIOLOGY | Facility: HOSPITAL | Age: 82
DRG: 853 | End: 2022-09-19
Attending: INTERNAL MEDICINE
Payer: MEDICARE

## 2022-09-19 LAB
BASOPHILS # BLD AUTO: 0.01 X10(3) UL (ref 0–0.2)
BASOPHILS NFR BLD AUTO: 0.2 %
CREAT BLD-MCNC: 0.88 MG/DL
EOSINOPHIL # BLD AUTO: 0.05 X10(3) UL (ref 0–0.7)
EOSINOPHIL NFR BLD AUTO: 0.8 %
ERYTHROCYTE [DISTWIDTH] IN BLOOD BY AUTOMATED COUNT: 13.5 %
GFR SERPLBLD BASED ON 1.73 SQ M-ARVRAT: 87 ML/MIN/1.73M2 (ref 60–?)
GLUCOSE BLD-MCNC: 101 MG/DL (ref 70–99)
GLUCOSE BLD-MCNC: 80 MG/DL (ref 70–99)
GLUCOSE BLD-MCNC: 87 MG/DL (ref 70–99)
GLUCOSE BLD-MCNC: 99 MG/DL (ref 70–99)
HCT VFR BLD AUTO: 26.9 %
HGB BLD-MCNC: 9.1 G/DL
IMM GRANULOCYTES # BLD AUTO: 0.04 X10(3) UL (ref 0–1)
IMM GRANULOCYTES NFR BLD: 0.6 %
LYMPHOCYTES # BLD AUTO: 0.63 X10(3) UL (ref 1–4)
LYMPHOCYTES NFR BLD AUTO: 9.9 %
MCH RBC QN AUTO: 33.2 PG (ref 26–34)
MCHC RBC AUTO-ENTMCNC: 33.8 G/DL (ref 31–37)
MCV RBC AUTO: 98.2 FL
MONOCYTES # BLD AUTO: 0.43 X10(3) UL (ref 0.1–1)
MONOCYTES NFR BLD AUTO: 6.8 %
NEUTROPHILS # BLD AUTO: 5.19 X10 (3) UL (ref 1.5–7.7)
NEUTROPHILS # BLD AUTO: 5.19 X10(3) UL (ref 1.5–7.7)
NEUTROPHILS NFR BLD AUTO: 81.7 %
PLATELET # BLD AUTO: 141 10(3)UL (ref 150–450)
RBC # BLD AUTO: 2.74 X10(6)UL
VANCOMYCIN TROUGH SERPL-MCNC: 10.6 UG/ML (ref 10–20)
WBC # BLD AUTO: 6.4 X10(3) UL (ref 4–11)

## 2022-09-19 PROCEDURE — 74230 X-RAY XM SWLNG FUNCJ C+: CPT | Performed by: HOSPITALIST

## 2022-09-19 PROCEDURE — 71045 X-RAY EXAM CHEST 1 VIEW: CPT | Performed by: INTERNAL MEDICINE

## 2022-09-19 PROCEDURE — 71045 X-RAY EXAM CHEST 1 VIEW: CPT | Performed by: HOSPITALIST

## 2022-09-19 RX ORDER — ASPIRIN 300 MG/1
300 SUPPOSITORY RECTAL DAILY
Status: DISCONTINUED | OUTPATIENT
Start: 2022-09-19 | End: 2022-09-20 | Stop reason: SDUPTHER

## 2022-09-19 RX ORDER — ASPIRIN 325 MG
325 TABLET, DELAYED RELEASE (ENTERIC COATED) ORAL DAILY
Status: DISCONTINUED | OUTPATIENT
Start: 2022-09-19 | End: 2022-09-20 | Stop reason: SDUPTHER

## 2022-09-19 RX ORDER — METOPROLOL TARTRATE 50 MG/1
50 TABLET, FILM COATED ORAL
Status: DISCONTINUED | OUTPATIENT
Start: 2022-09-19 | End: 2022-09-30

## 2022-09-19 NOTE — PLAN OF CARE
Pharmacy Dosing Vancomycin    80year old male on Zosyn and vancomycin for pneumonia following aspiration event on 9/16; recently with MRSA pneumonia. Vancomycin 1.75 gram load was given on 9/17; 1 gram was re-dosed on 9/18. Trough level checked today prior to third dose to assess adequate dosing. Labs:  Lab Results   Component Value Date    CREATSERUM 0.88 09/19/2022      Estimated CrCl 63.6 mL/min (based on SCr 0.88 mg/dL)    Levels:   Ref. Range 9/19/2022 12:22   VANCOMYCIN TROUGH Latest Ref Range: 10.0 - 20.0 ug/mL 10.6         Plan:   1. Not currently at steady state, but vancomycin trough level is within goal range of 10-15 mcg/mL. Will continue vancomycin 1 gram IV every 24 hours  2. We will monitor renal function, repeat levels, and adjust dose as necessary     Pharmacy will follow.      Rhonda Limon, PharmD  9/19/2022 7:36 PM

## 2022-09-19 NOTE — PLAN OF CARE
Pt. On 9L HFNC, sats 95%, productive cough. Slight right sided weakness present. Pt. To have swallow eval today. Surgical incisions RADHA dry and intact.

## 2022-09-19 NOTE — PLAN OF CARE
HFNC weaned down to 3L tonight, productive cough & expectorating sputum. Flutter valve & IS encouraged every hour. Failed video swallow this am, made strict NPO. Pt refused dobhoff @ first, but after education and explanation from multiple physicians, agreed to tube. Awaiting confirmation of placement. NSR c ectopy on monitor. Afebrile. See FS for neuro assessment. Continues to require straight cath for urine retention. Had continent BM today. Up to chair today x2 assist. Wife & dtr @ bedside. No other issues at this time.

## 2022-09-19 NOTE — CM/SW NOTE
Care Progression Note:  Length of stay: 16  GMLOS: 5.4  Avoidable Delays: none   Code Status: DNAR/Select    Acute Medical Issue/Factors:   Hypokalemia   Aspiration PNA  CVA  +Covid on 9/1     Discharge Barriers: clinical progression   Expected discharge date: TBD   Expected next site of care: TBD, acute rehab previously recommended (may need new PM&R evaluation)     Patient is currently in CNICU care with fluctuating oxygen needs (9-15L/min continuous). Video swallow evaluation ordered for aspiration episode yesterday. Speech therapy recommending nothing by mouth with alternate source of nutrition at this time. SW/CM to follow up for care planing needs.     Mariel Youngblood RN Case Manager [phone]

## 2022-09-19 NOTE — PLAN OF CARE
Assumed care for this pt at [address] [place] [address]. Pt on 8L HF nasal canula sating at 90-92%. Plan for vss Monday, pt irritable due to npo order, pt requesting water but unable to expel secretions self. Left CEA incision bruised and edematous.  Will continue to monitor

## 2022-09-19 NOTE — PROGRESS NOTES
Patient did poorly on Video Swallow- Seen with Opal/ Dr. Domenica Velazquez- he is agreeable for re-try on Doboff feeding tube. Otherwise neurologically intact.

## 2022-09-20 LAB
ALBUMIN SERPL-MCNC: 1.6 G/DL (ref 3.4–5)
ALP LIVER SERPL-CCNC: 367 U/L
ALT SERPL-CCNC: 73 U/L
AST SERPL-CCNC: 37 U/L (ref 15–37)
BILIRUB DIRECT SERPL-MCNC: 0.4 MG/DL (ref 0–0.2)
BILIRUB SERPL-MCNC: 1 MG/DL (ref 0.1–2)
CREAT BLD-MCNC: 0.76 MG/DL
GFR SERPLBLD BASED ON 1.73 SQ M-ARVRAT: 91 ML/MIN/1.73M2 (ref 60–?)
GLUCOSE BLD-MCNC: 168 MG/DL (ref 70–99)
GLUCOSE BLD-MCNC: 189 MG/DL (ref 70–99)
GLUCOSE BLD-MCNC: 75 MG/DL (ref 70–99)
GLUCOSE BLD-MCNC: 78 MG/DL (ref 70–99)
GLUCOSE BLD-MCNC: 94 MG/DL (ref 70–99)
MAGNESIUM SERPL-MCNC: 1.7 MG/DL (ref 1.6–2.6)
POTASSIUM SERPL-SCNC: 3.1 MMOL/L (ref 3.5–5.1)
POTASSIUM SERPL-SCNC: 4.2 MMOL/L (ref 3.5–5.1)
PROT SERPL-MCNC: 5.4 G/DL (ref 6.4–8.2)

## 2022-09-20 RX ORDER — MAGNESIUM OXIDE 400 MG/1
400 TABLET ORAL ONCE
Status: COMPLETED | OUTPATIENT
Start: 2022-09-20 | End: 2022-09-20

## 2022-09-20 RX ORDER — SODIUM BICARBONATE 325 MG/1
325 TABLET ORAL AS NEEDED
Status: DISCONTINUED | OUTPATIENT
Start: 2022-09-20 | End: 2022-09-30

## 2022-09-20 RX ORDER — POTASSIUM CHLORIDE 1.5 G/1.77G
40 POWDER, FOR SOLUTION ORAL EVERY 4 HOURS
Status: COMPLETED | OUTPATIENT
Start: 2022-09-20 | End: 2022-09-20

## 2022-09-20 RX ORDER — ASPIRIN 325 MG
325 TABLET ORAL DAILY
Status: DISCONTINUED | OUTPATIENT
Start: 2022-09-20 | End: 2022-09-22

## 2022-09-20 RX ORDER — ASPIRIN 300 MG/1
300 SUPPOSITORY RECTAL DAILY
Status: DISCONTINUED | OUTPATIENT
Start: 2022-09-20 | End: 2022-09-20 | Stop reason: SDUPTHER

## 2022-09-20 NOTE — PLAN OF CARE
Assumed care of pt around 1930. A+Ox4. Hard of hearing. Mild R sided weakness. SR with PVCs. Breath sounds rhonchi on HFNC. Pt able to self suction mod-large amounts of tan secretions. NPO, aspirations. DHT placement verified with CXR, meds admin through tube. Dietary c/s placed, but no TF orders yet. Accuchecks q6h. Bladder scan protocol, straight cath as needed. Will continue to monitor.

## 2022-09-20 NOTE — PLAN OF CARE
Assumed care of patient at 0730. Patient alert X 4. Neuro intact. NSR on monitor. Received patient on 3L NC. Able to wean off. Maintains O2 sats 93-96%. Frequent suctioning with productive cough. Sbp normotensive. Left neck incision and right groin site c/d/I. TF started, tolerating well. St. Cath PRN. BM X 1. Denies pain. Up to chair. Problem: SAFETY ADULT - FALL  Goal: Free from fall injury  Description: INTERVENTIONS:  - Assess pt frequently for physical needs  - Identify cognitive and physical deficits and behaviors that affect risk of falls.   - [place] fall precautions as indicated by assessment.  - Educate pt/family on patient safety including physical limitations  - Instruct pt to call for assistance with activity based on assessment  - Modify environment to reduce risk of injury  - Provide assistive devices as appropriate  - Consider OT/PT consult to assist with strengthening/mobility  - Encourage toileting schedule  Outcome: Progressing     Problem: Patient/Family Goals  Goal: Patient/Family Long Term Goal  Description: Patient's Long Term Goal: Discharge with adequate resources    Interventions:  - Follow care plan and collaborate with health care team   - See additional Care Plan goals for specific interventions  Outcome: Progressing  Goal: Patient/Family Short Term Goal  Description: Patient's Short Term Goal:   9/2 NOC: remain free from falls/injuries, manage agitation    Interventions:   - cluster care  -prn ativan  - See additional Care Plan goals for specific interventions  Outcome: Progressing     Problem: CARDIOVASCULAR - ADULT  Goal: Absence of cardiac arrhythmias or at baseline  Description: INTERVENTIONS:  - Continuous cardiac monitoring, monitor vital signs, obtain 12 lead EKG if indicated  - Evaluate effectiveness of antiarrhythmic and heart rate control medications as ordered  - Initiate emergency measures for life threatening arrhythmias  - Monitor electrolytes and administer replacement therapy as ordered  Outcome: Progressing     Problem: METABOLIC/FLUID AND ELECTROLYTES - ADULT  Goal: Electrolytes maintained within normal limits  Description: INTERVENTIONS:  - Monitor labs and rhythm and assess patient for signs and symptoms of electrolyte imbalances  - Administer electrolyte replacement as ordered  - Monitor response to electrolyte replacements, including rhythm and repeat lab results as appropriate  - Fluid restriction as ordered  - Instruct patient on fluid and nutrition restrictions as appropriate  Outcome: Progressing  Goal: Hemodynamic stability and optimal renal function maintained  Description: INTERVENTIONS:  - Monitor labs and assess for signs and symptoms of volume excess or deficit  - Monitor intake, output and patient weight  - Monitor urine specific gravity, serum osmolarity and serum sodium as indicated or ordered  - Monitor response to interventions for patient's volume status, including labs, urine output, blood pressure (other measures as available)  - Encourage oral intake as appropriate  - Instruct patient on fluid and nutrition restrictions as appropriate  Outcome: Progressing

## 2022-09-20 NOTE — PROGRESS NOTES
Reviewed chart- had aspiration/ decreased cognitive function on 9/4- no video swallow study that I can see. Abnormal video swallow yesterday. Patient up in chair/ wife at bedside- start TF through [facility] today. Neur appears baseline. Neck- no swelling/ ecchymosis. Speech therapy to follow to try to improve swallowing with exercises. Try to improve nutrition. Answered questions of patient/ wife.  POD # 6

## 2022-09-21 LAB
ANION GAP SERPL CALC-SCNC: 7 MMOL/L (ref 0–18)
BUN BLD-MCNC: 16 MG/DL (ref 7–18)
CALCIUM BLD-MCNC: 8.3 MG/DL (ref 8.5–10.1)
CHLORIDE SERPL-SCNC: 105 MMOL/L (ref 98–112)
CO2 SERPL-SCNC: 25 MMOL/L (ref 21–32)
CREAT BLD-MCNC: 0.76 MG/DL
CREAT BLD-MCNC: 0.76 MG/DL
DEPRECATED HBV CORE AB SER IA-ACNC: 468.9 NG/ML
ERYTHROCYTE [DISTWIDTH] IN BLOOD BY AUTOMATED COUNT: 13.2 %
GFR SERPLBLD BASED ON 1.73 SQ M-ARVRAT: 91 ML/MIN/1.73M2 (ref 60–?)
GFR SERPLBLD BASED ON 1.73 SQ M-ARVRAT: 91 ML/MIN/1.73M2 (ref 60–?)
GLUCOSE BLD-MCNC: 111 MG/DL (ref 70–99)
GLUCOSE BLD-MCNC: 162 MG/DL (ref 70–99)
GLUCOSE BLD-MCNC: 175 MG/DL (ref 70–99)
GLUCOSE BLD-MCNC: 183 MG/DL (ref 70–99)
HAPTOGLOB SERPL-MCNC: 206 MG/DL (ref 30–200)
HCT VFR BLD AUTO: 23.5 %
HGB BLD-MCNC: 7.7 G/DL
HGB RETIC QN AUTO: 28.3 PG (ref 28.2–36.6)
IMM RETICS NFR: 0.12 RATIO (ref 0.1–0.3)
IRON SATN MFR SERPL: 34 %
IRON SERPL-MCNC: 48 UG/DL
MAGNESIUM SERPL-MCNC: 1.7 MG/DL (ref 1.6–2.6)
MCH RBC QN AUTO: 32.6 PG (ref 26–34)
MCHC RBC AUTO-ENTMCNC: 32.8 G/DL (ref 31–37)
MCV RBC AUTO: 99.6 FL
OSMOLALITY SERPL CALC.SUM OF ELEC: 289 MOSM/KG (ref 275–295)
PHOSPHATE SERPL-MCNC: 1.7 MG/DL (ref 2.5–4.9)
PLATELET # BLD AUTO: 143 10(3)UL (ref 150–450)
POTASSIUM SERPL-SCNC: 3.7 MMOL/L (ref 3.5–5.1)
RBC # BLD AUTO: 2.36 X10(6)UL
RETICS # AUTO: 30.2 X10(3) UL (ref 22.5–147.5)
RETICS/RBC NFR AUTO: 1.3 %
SODIUM SERPL-SCNC: 137 MMOL/L (ref 136–145)
TIBC SERPL-MCNC: 143 UG/DL (ref 240–450)
TRANSFERRIN SERPL-MCNC: 96 MG/DL (ref 200–360)
WBC # BLD AUTO: 4.7 X10(3) UL (ref 4–11)

## 2022-09-21 PROCEDURE — 99291 CRITICAL CARE FIRST HOUR: CPT | Performed by: OTHER

## 2022-09-21 RX ORDER — IPRATROPIUM BROMIDE AND ALBUTEROL SULFATE 2.5; .5 MG/3ML; MG/3ML
3 SOLUTION RESPIRATORY (INHALATION)
Status: DISCONTINUED | OUTPATIENT
Start: 2022-09-21 | End: 2022-09-24

## 2022-09-21 RX ORDER — RUFINAMIDE 40 MG/ML
1 SUSPENSION ORAL DAILY
Status: DISCONTINUED | OUTPATIENT
Start: 2022-09-21 | End: 2022-09-30

## 2022-09-21 RX ORDER — MAGNESIUM SULFATE HEPTAHYDRATE 40 MG/ML
2 INJECTION, SOLUTION INTRAVENOUS ONCE
Status: COMPLETED | OUTPATIENT
Start: 2022-09-21 | End: 2022-09-21

## 2022-09-21 NOTE — PLAN OF CARE
0730-received patient asleep-allowed to rest comfortably  0900-awake alert oriented-monitor sr-assist up to chair(assist x 2)-lungs clear but diminished -productive cough-thick mucous suctioned by pt.    Tube feeds via dobhoff-no residual, ausculted  Left neck incision well approximated-bruised  Right arm weakness unchanged per patient  Bath, hair wash and teeth brushed  Discussed plan of care for today-increase activity as able  Verbalizes understanding

## 2022-09-21 NOTE — PROGRESS NOTES
Fe called wondering what the parameters were for metoprolol. Explained directions: do not take if SBP <90. Understood. Rescheduled patient appointment to a sooner date to follow up.    No coughing / swallowing problems now. Neuro at baseline. No  swelling left neck. Ecchymosis/ wound intact. Trachea midline.  To telemetry today

## 2022-09-21 NOTE — PLAN OF CARE
Assumed care of pt around 1930. A+Ox4. Hard of hearing. Mild R sided weakness. Denies pain. SR. Rhonchi. Self suction mod-large amounts of tan secretions. RA while awake. 4 L O2 via NC for sleep. Tolerating continuous TF through [address]. Bladder scan protocol, straight cath as needed. Will continue to monitor.

## 2022-09-22 ENCOUNTER — APPOINTMENT (OUTPATIENT)
Dept: MRI IMAGING | Facility: HOSPITAL | Age: 82
DRG: 853 | End: 2022-09-22
Attending: Other
Payer: MEDICARE

## 2022-09-22 LAB
ANION GAP SERPL CALC-SCNC: 5 MMOL/L (ref 0–18)
ANTIBODY SCREEN: NEGATIVE
BASOPHILS # BLD AUTO: 0.01 X10(3) UL (ref 0–0.2)
BASOPHILS NFR BLD AUTO: 0.3 %
BILIRUB UR QL CFM: NEGATIVE
BUN BLD-MCNC: 12 MG/DL (ref 7–18)
CALCIUM BLD-MCNC: 7.6 MG/DL (ref 8.5–10.1)
CHLORIDE SERPL-SCNC: 106 MMOL/L (ref 98–112)
CLARITY UR REFRACT.AUTO: CLEAR
CO2 SERPL-SCNC: 27 MMOL/L (ref 21–32)
COLOR UR AUTO: YELLOW
CREAT BLD-MCNC: 0.92 MG/DL
EOSINOPHIL # BLD AUTO: 0.06 X10(3) UL (ref 0–0.7)
EOSINOPHIL NFR BLD AUTO: 1.6 %
ERYTHROCYTE [DISTWIDTH] IN BLOOD BY AUTOMATED COUNT: 13.3 %
GFR SERPLBLD BASED ON 1.73 SQ M-ARVRAT: 85 ML/MIN/1.73M2 (ref 60–?)
GLUCOSE BLD-MCNC: 138 MG/DL (ref 70–99)
GLUCOSE BLD-MCNC: 156 MG/DL (ref 70–99)
GLUCOSE BLD-MCNC: 183 MG/DL (ref 70–99)
GLUCOSE BLD-MCNC: 187 MG/DL (ref 70–99)
GLUCOSE UR STRIP.AUTO-MCNC: NEGATIVE MG/DL
HCT VFR BLD AUTO: 20 %
HGB BLD-MCNC: 6.6 G/DL
HGB BLD-MCNC: 6.9 G/DL
HGB BLD-MCNC: 7.8 G/DL
IMM GRANULOCYTES # BLD AUTO: 0.02 X10(3) UL (ref 0–1)
IMM GRANULOCYTES NFR BLD: 0.5 %
KETONES UR STRIP.AUTO-MCNC: NEGATIVE MG/DL
LYMPHOCYTES # BLD AUTO: 0.67 X10(3) UL (ref 1–4)
LYMPHOCYTES NFR BLD AUTO: 17.4 %
MAGNESIUM SERPL-MCNC: 2 MG/DL (ref 1.6–2.6)
MCH RBC QN AUTO: 32.2 PG (ref 26–34)
MCHC RBC AUTO-ENTMCNC: 33 G/DL (ref 31–37)
MCV RBC AUTO: 97.6 FL
MONOCYTES # BLD AUTO: 0.37 X10(3) UL (ref 0.1–1)
MONOCYTES NFR BLD AUTO: 9.6 %
NEUTROPHILS # BLD AUTO: 2.73 X10 (3) UL (ref 1.5–7.7)
NEUTROPHILS # BLD AUTO: 2.73 X10(3) UL (ref 1.5–7.7)
NEUTROPHILS NFR BLD AUTO: 70.6 %
NITRITE UR QL STRIP.AUTO: NEGATIVE
OSMOLALITY SERPL CALC.SUM OF ELEC: 288 MOSM/KG (ref 275–295)
PH UR STRIP.AUTO: 7 [PH] (ref 5–8)
PHOSPHATE SERPL-MCNC: 2.4 MG/DL (ref 2.5–4.9)
PLATELET # BLD AUTO: 120 10(3)UL (ref 150–450)
POTASSIUM SERPL-SCNC: 3.6 MMOL/L (ref 3.5–5.1)
POTASSIUM SERPL-SCNC: 3.6 MMOL/L (ref 3.5–5.1)
RBC # BLD AUTO: 2.05 X10(6)UL
RBC #/AREA URNS AUTO: >10 /HPF
RBC #/AREA URNS AUTO: >10 /HPF
RH BLOOD TYPE: POSITIVE
SODIUM SERPL-SCNC: 138 MMOL/L (ref 136–145)
SP GR UR STRIP.AUTO: 1.01 (ref 1–1.03)
UROBILINOGEN UR STRIP.AUTO-MCNC: 2 MG/DL
VANCOMYCIN PEAK SERPL-MCNC: 21 UG/ML (ref 30–50)
WBC # BLD AUTO: 3.9 X10(3) UL (ref 4–11)

## 2022-09-22 PROCEDURE — 30233N1 TRANSFUSION OF NONAUTOLOGOUS RED BLOOD CELLS INTO PERIPHERAL VEIN, PERCUTANEOUS APPROACH: ICD-10-PCS | Performed by: INTERNAL MEDICINE

## 2022-09-22 PROCEDURE — 70551 MRI BRAIN STEM W/O DYE: CPT | Performed by: OTHER

## 2022-09-22 PROCEDURE — 99232 SBSQ HOSP IP/OBS MODERATE 35: CPT | Performed by: NURSE PRACTITIONER

## 2022-09-22 RX ORDER — SODIUM CHLORIDE 9 MG/ML
INJECTION, SOLUTION INTRAVENOUS ONCE
Status: COMPLETED | OUTPATIENT
Start: 2022-09-22 | End: 2022-09-22

## 2022-09-22 RX ORDER — POTASSIUM CHLORIDE 14.9 MG/ML
20 INJECTION INTRAVENOUS ONCE
Status: COMPLETED | OUTPATIENT
Start: 2022-09-22 | End: 2022-09-22

## 2022-09-22 NOTE — OCCUPATIONAL THERAPY NOTE
Attempted to see pt for skilled OT services this date. Pt is currently pending brain MRI. RN requests we hold therapy until after results of MRI. Will re-attempt as schedule allows.

## 2022-09-22 NOTE — PROGRESS NOTES
Assumed care @ 1930. Pt a/o x4, VSS. Tele SR. No acute respiratory distress noted. Denies any pain. Pt resting in bed. TF infusing per Dubhoff. Meds crushed given through Dubhoff. (-) BM. Pt unable to void, bladder scan 503 mL. Straight cath @ 0530, 600 mL tea colored urine noted. Plan for MRI of brain with IV Fentanyl today. No other complaints made. Will continue to monitor. Addendum  @ 5702  Received call from Lab - Hgb 6.6. Oren Hsieh hospitalist paged to notify, waiting for response.

## 2022-09-22 NOTE — CONSULTS
[address]    PATIENT'S NAME: Silva Villanueva   ATTENDING PHYSICIAN: Tricia Guillermo DO   CONSULTING PHYSICIAN: Sidney Arora M.D. PATIENT ACCOUNT#:   [de-identified]    LOCATION:  [address] [place] [address]  MEDICAL RECORD #:   [id number]       YOB: 1940  ADMISSION DATE:       09/01/2022      CONSULT DATE:  09/22/2022    REPORT OF CONSULTATION    FOLLOWUP CONSULTATION    The patient is seen with wife present at bedside, who was getting Dobbhoff feedings. Hemoglobin had drifted down. A note was read from Dr. Miguel Madrigal, may be spurious result, but will give transfusion, concern about this. No evidence for any GI bleeding or hematuria. The patient neurologically is at baseline. He is still coughing but tolerating his secretions. 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 The nerve was adherent to the posterior wall of the carotid artery during the dissection and was teased away from the artery. There was no clamp injury or transection of the nerve, so, hopefully, this is just a temporary problem and will get better. The trachea is midline, and ecchymosis is resolving. There is no swelling in the neck area. If there is a vocal cord paresis, then would wait to see if this improves. If it does cause a problem, then possibility for a colloid injection into the vocal cord to stabilize it is a possibility, but we will defer to ENT, and at this point, will just continue to watch and give it time to heal.  It should improve technically. Otherwise, all questions were answered for the patient and his wife.     Dictated By Sidney Arora M.D.  d: 09/22/2022 12:27:55  t: 09/22/2022 14:05:25  Job [id number]  JJW/    cc: Sidney Arora M.D.

## 2022-09-22 NOTE — PLAN OF CARE
Assumed care of patient at 1600  Skin tear to LUE, dressing changed  Bladder scan shows >700mL, straight cath 850mL  TF resumed once patient arrived to floor  MRI to most likely be completed tomorrow, screening form completed  Denies pain  Wife at the bedside, call light within reach

## 2022-09-23 LAB
ANION GAP SERPL CALC-SCNC: 8 MMOL/L (ref 0–18)
BASOPHILS # BLD AUTO: 0.03 X10(3) UL (ref 0–0.2)
BASOPHILS NFR BLD AUTO: 0.6 %
BLOOD TYPE BARCODE: 5100
BUN BLD-MCNC: 12 MG/DL (ref 7–18)
CALCIUM BLD-MCNC: 7.9 MG/DL (ref 8.5–10.1)
CHLORIDE SERPL-SCNC: 104 MMOL/L (ref 98–112)
CO2 SERPL-SCNC: 26 MMOL/L (ref 21–32)
CREAT BLD-MCNC: 0.92 MG/DL
EOSINOPHIL # BLD AUTO: 0.11 X10(3) UL (ref 0–0.7)
EOSINOPHIL NFR BLD AUTO: 2.4 %
ERYTHROCYTE [DISTWIDTH] IN BLOOD BY AUTOMATED COUNT: 15.3 %
GFR SERPLBLD BASED ON 1.73 SQ M-ARVRAT: 85 ML/MIN/1.73M2 (ref 60–?)
GLUCOSE BLD-MCNC: 131 MG/DL (ref 70–99)
GLUCOSE BLD-MCNC: 139 MG/DL (ref 70–99)
GLUCOSE BLD-MCNC: 158 MG/DL (ref 70–99)
GLUCOSE BLD-MCNC: 166 MG/DL (ref 70–99)
GLUCOSE BLD-MCNC: 170 MG/DL (ref 70–99)
GLUCOSE BLD-MCNC: 192 MG/DL (ref 70–99)
HAPTOGLOB SERPL-MCNC: 130 MG/DL (ref 30–200)
HCT VFR BLD AUTO: 23.2 %
HGB BLD-MCNC: 7.8 G/DL
IMM GRANULOCYTES # BLD AUTO: 0.06 X10(3) UL (ref 0–1)
IMM GRANULOCYTES NFR BLD: 1.3 %
LDH SERPL L TO P-CCNC: 134 U/L
LYMPHOCYTES # BLD AUTO: 0.59 X10(3) UL (ref 1–4)
LYMPHOCYTES NFR BLD AUTO: 12.6 %
MCH RBC QN AUTO: 31.8 PG (ref 26–34)
MCHC RBC AUTO-ENTMCNC: 33.6 G/DL (ref 31–37)
MCV RBC AUTO: 94.7 FL
MONOCYTES # BLD AUTO: 0.39 X10(3) UL (ref 0.1–1)
MONOCYTES NFR BLD AUTO: 8.3 %
NEUTROPHILS # BLD AUTO: 3.5 X10 (3) UL (ref 1.5–7.7)
NEUTROPHILS # BLD AUTO: 3.5 X10(3) UL (ref 1.5–7.7)
NEUTROPHILS NFR BLD AUTO: 74.8 %
OSMOLALITY SERPL CALC.SUM OF ELEC: 289 MOSM/KG (ref 275–295)
PHOSPHATE SERPL-MCNC: 2.8 MG/DL (ref 2.5–4.9)
PLATELET # BLD AUTO: 116 10(3)UL (ref 150–450)
POTASSIUM SERPL-SCNC: 3.9 MMOL/L (ref 3.5–5.1)
POTASSIUM SERPL-SCNC: 3.9 MMOL/L (ref 3.5–5.1)
RBC # BLD AUTO: 2.45 X10(6)UL
SODIUM SERPL-SCNC: 138 MMOL/L (ref 136–145)
VANCOMYCIN TROUGH SERPL-MCNC: 10.4 UG/ML (ref 10–20)
WBC # BLD AUTO: 4.7 X10(3) UL (ref 4–11)

## 2022-09-23 PROCEDURE — 99233 SBSQ HOSP IP/OBS HIGH 50: CPT | Performed by: OTHER

## 2022-09-23 PROCEDURE — 0CJS8ZZ INSPECTION OF LARYNX, VIA NATURAL OR ARTIFICIAL OPENING ENDOSCOPIC: ICD-10-PCS | Performed by: OTOLARYNGOLOGY

## 2022-09-23 RX ORDER — VANCOMYCIN HYDROCHLORIDE
1250 EVERY 24 HOURS
Status: DISCONTINUED | OUTPATIENT
Start: 2022-09-24 | End: 2022-09-26

## 2022-09-23 NOTE — PROGRESS NOTES
Assumed patient care at 1930  Sinus on tele, vitals stable on o22lnc   Denies pain, npo, tube feeds per orders   Bladder scan q6, prn straight cath if >400  Safety and isolation precautions in place   Will continue to monitor

## 2022-09-23 NOTE — PROGRESS NOTES
Notes read-. Neuro unchanged. 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 Afebrile. Dysphagia occurring 2 days after surgery. Was not really complaining of hoarseness after operation- Discussed with Dr. Tone Jean-Baptiste- he will see patient- but will wait til Monday. Although patient had aspiration before surgery- and no video swallow before surgery- believe this vocal cord paresis from moving vagus nerve away- should be reversible.  Treatment options explained to me by Tone Hernández

## 2022-09-23 NOTE — PLAN OF CARE
Vac trough.    Extra dose vanomycin given  Neuros q4h  Straight cath every 6 hours  Tube feeding with no complications

## 2022-09-23 NOTE — PLAN OF CARE
Pt AxO4  2 L NC  -wean as tolerated  -use IS every hour 10x  -Erikawill Givens at bedside   =output noted  NSR  No pain at this time  2x assist/total assist with walker  -up to chair    HEENT  -Dobhoff in place   =TF tolerated   = water flushes q4h  Wound  -L neck incision   =C/D/I  =R groin incision   =C/D/I  Consulted GI  -repeat Hgb level completed    -bladder scan q6h   =straight cath 1x  -UA collected  MRI completed  Monitor/manage BG level  Monitor/manage BP level  Monitor/manage Hgb level  -1 unit RBC given  IV antibiotics  K and P replaced  Isolation precaution  Aspiration precaution  Pt and family informed of POC, all questions answered

## 2022-09-24 ENCOUNTER — APPOINTMENT (OUTPATIENT)
Dept: GENERAL RADIOLOGY | Facility: HOSPITAL | Age: 82
DRG: 853 | End: 2022-09-24
Attending: HOSPITALIST
Payer: MEDICARE

## 2022-09-24 LAB
ANION GAP SERPL CALC-SCNC: 5 MMOL/L (ref 0–18)
BASOPHILS # BLD AUTO: 0.01 X10(3) UL (ref 0–0.2)
BASOPHILS NFR BLD AUTO: 0.2 %
BUN BLD-MCNC: 13 MG/DL (ref 7–18)
CALCIUM BLD-MCNC: 8.1 MG/DL (ref 8.5–10.1)
CHLORIDE SERPL-SCNC: 104 MMOL/L (ref 98–112)
CO2 SERPL-SCNC: 23 MMOL/L (ref 21–32)
CREAT BLD-MCNC: 0.95 MG/DL
EOSINOPHIL # BLD AUTO: 0.14 X10(3) UL (ref 0–0.7)
EOSINOPHIL NFR BLD AUTO: 2.6 %
ERYTHROCYTE [DISTWIDTH] IN BLOOD BY AUTOMATED COUNT: 15.3 %
GFR SERPLBLD BASED ON 1.73 SQ M-ARVRAT: 81 ML/MIN/1.73M2 (ref 60–?)
GLUCOSE BLD-MCNC: 138 MG/DL (ref 70–99)
GLUCOSE BLD-MCNC: 146 MG/DL (ref 70–99)
GLUCOSE BLD-MCNC: 150 MG/DL (ref 70–99)
GLUCOSE BLD-MCNC: 171 MG/DL (ref 70–99)
GLUCOSE BLD-MCNC: 95 MG/DL (ref 70–99)
HCT VFR BLD AUTO: 21.3 %
HGB BLD-MCNC: 7.2 G/DL
IMM GRANULOCYTES # BLD AUTO: 0.04 X10(3) UL (ref 0–1)
IMM GRANULOCYTES NFR BLD: 0.7 %
LYMPHOCYTES # BLD AUTO: 0.66 X10(3) UL (ref 1–4)
LYMPHOCYTES NFR BLD AUTO: 12.1 %
MCH RBC QN AUTO: 32 PG (ref 26–34)
MCHC RBC AUTO-ENTMCNC: 33.8 G/DL (ref 31–37)
MCV RBC AUTO: 94.7 FL
MONOCYTES # BLD AUTO: 0.37 X10(3) UL (ref 0.1–1)
MONOCYTES NFR BLD AUTO: 6.8 %
NEUTROPHILS # BLD AUTO: 4.25 X10 (3) UL (ref 1.5–7.7)
NEUTROPHILS # BLD AUTO: 4.25 X10(3) UL (ref 1.5–7.7)
NEUTROPHILS NFR BLD AUTO: 77.6 %
OSMOLALITY SERPL CALC.SUM OF ELEC: 277 MOSM/KG (ref 275–295)
PLATELET # BLD AUTO: 118 10(3)UL (ref 150–450)
POTASSIUM SERPL-SCNC: 4.2 MMOL/L (ref 3.5–5.1)
RBC # BLD AUTO: 2.25 X10(6)UL
SODIUM SERPL-SCNC: 132 MMOL/L (ref 136–145)
WBC # BLD AUTO: 5.5 X10(3) UL (ref 4–11)

## 2022-09-24 PROCEDURE — 74230 X-RAY XM SWLNG FUNCJ C+: CPT | Performed by: HOSPITALIST

## 2022-09-24 RX ORDER — IPRATROPIUM BROMIDE AND ALBUTEROL SULFATE 2.5; .5 MG/3ML; MG/3ML
3 SOLUTION RESPIRATORY (INHALATION) EVERY 4 HOURS PRN
Status: DISCONTINUED | OUTPATIENT
Start: 2022-09-24 | End: 2022-09-30

## 2022-09-24 NOTE — CM/SW NOTE
Spoke with nurse, patient passed video swallow today, will work with PT today, will request MJ re eval and submit for insurance auth

## 2022-09-24 NOTE — PROGRESS NOTES
Assumed care at [address]. Patient A&Ox4. Neuros q 4, no acute changes. Tele SR, on room air. Denies pain.  NPO. Dubhoff with TF infusing. Unable to void. Bladder scan q 6,straight cath x 2. IV abx as ordered. Lt neck and right groin incisions approximated. Patient updated on POC.   Awaiting repeat ENT eval.

## 2022-09-24 NOTE — PLAN OF CARE
Assumed patient care at 0730. Vital signs stable. Patient alert and oriented x 4. Patient denies pain. Passed swallow eval for regular food and honey thickened liquids. Bladder scan q 6h and straight cath as necessary. Cleased and redressed left arm skin tear. Problem: SAFETY ADULT - FALL  Goal: Free from fall injury  Description: INTERVENTIONS:  - Assess pt frequently for physical needs  - Identify cognitive and physical deficits and behaviors that affect risk of falls.   - [place] fall precautions as indicated by assessment.  - Educate pt/family on patient safety including physical limitations  - Instruct pt to call for assistance with activity based on assessment  - Modify environment to reduce risk of injury  - Provide assistive devices as appropriate  - Consider OT/PT consult to assist with strengthening/mobility  - Encourage toileting schedule  Outcome: Progressing     Problem: Patient/Family Goals  Goal: Patient/Family Long Term Goal  Description: Patient's Long Term Goal: Discharge with adequate resources    Interventions:  - Follow care plan and collaborate with health care team   - See additional Care Plan goals for specific interventions  Outcome: Progressing  Goal: Patient/Family Short Term Goal  Description: Patient's Short Term Goal:   9/2 NOC: remain free from falls/injuries, manage agitation    Interventions:   - cluster care  -prn ativan  - See additional Care Plan goals for specific interventions  Outcome: Progressing     Problem: CARDIOVASCULAR - ADULT  Goal: Absence of cardiac arrhythmias or at baseline  Description: INTERVENTIONS:  - Continuous cardiac monitoring, monitor vital signs, obtain 12 lead EKG if indicated  - Evaluate effectiveness of antiarrhythmic and heart rate control medications as ordered  - Initiate emergency measures for life threatening arrhythmias  - Monitor electrolytes and administer replacement therapy as ordered  Outcome: Progressing     Problem: METABOLIC/FLUID AND ELECTROLYTES - ADULT  Goal: Electrolytes maintained within normal limits  Description: INTERVENTIONS:  - Monitor labs and rhythm and assess patient for signs and symptoms of electrolyte imbalances  - Administer electrolyte replacement as ordered  - Monitor response to electrolyte replacements, including rhythm and repeat lab results as appropriate  - Fluid restriction as ordered  - Instruct patient on fluid and nutrition restrictions as appropriate  Outcome: Progressing  Goal: Hemodynamic stability and optimal renal function maintained  Description: INTERVENTIONS:  - Monitor labs and assess for signs and symptoms of volume excess or deficit  - Monitor intake, output and patient weight  - Monitor urine specific gravity, serum osmolarity and serum sodium as indicated or ordered  - Monitor response to interventions for patient's volume status, including labs, urine output, blood pressure (other measures as available)  - Encourage oral intake as appropriate  - Instruct patient on fluid and nutrition restrictions as appropriate  Outcome: Progressing     Problem: SKIN/TISSUE INTEGRITY - ADULT  Goal: Skin integrity remains intact  Description: INTERVENTIONS  - Assess and document risk factors for pressure ulcer development  - Assess and document skin integrity  - Monitor for areas of redness and/or skin breakdown  - Initiate interventions, skin care algorithm/standards of care as needed  Outcome: Progressing     Problem: MUSCULOSKELETAL - ADULT  Goal: Return mobility to safest level of function  Description: INTERVENTIONS:  - Assess patient stability and activity tolerance for standing, transferring and ambulating w/ or w/o assistive devices  - Assist with transfers and ambulation using safe patient handling equipment as needed  - Ensure adequate protection for wounds/incisions during mobilization  - Obtain PT/OT consults as needed  - Advance activity as appropriate  - Communicate ordered activity level and limitations with patient/family  Outcome: Progressing     Problem: NEUROLOGICAL - ADULT  Goal: Achieves stable or improved neurological status  Description: INTERVENTIONS  - Assess for and report changes in neurological status  - Initiate measures to prevent increased intracranial pressure  - Maintain blood pressure and fluid volume within ordered parameters to optimize cerebral perfusion and minimize risk of hemorrhage  - Monitor temperature, glucose, and sodium.  Initiate appropriate interventions as ordered  Outcome: Progressing     Problem: Diabetes/Glucose Control  Goal: Glucose maintained within prescribed range  Description: INTERVENTIONS:  - Monitor Blood Glucose as ordered  - Assess for signs and symptoms of hyperglycemia and hypoglycemia  - Administer ordered medications to maintain glucose within target range  - Assess barriers to adequate nutritional intake and initiate nutrition consult as needed  - Instruct patient on self management of diabetes  Outcome: Progressing

## 2022-09-24 NOTE — CONSULTS
[address]    PATIENT'S NAME: Meaghan Orellana   ATTENDING PHYSICIAN: Kaitlin Angeles DO   CONSULTING PHYSICIAN: Jessica Tamayo M.D. PATIENT ACCOUNT#:   [de-identified]    LOCATION:  [address] [place] [address]  MEDICAL RECORD #:   [id number]       YOB: 1940  ADMISSION DATE:       09/01/2022      CONSULT DATE:  09/24/2022    REPORT OF CONSULTATION    FOLLOWUP CONSULTATION    Patient overall doing well. He feels better. He is not coughing as much. Vital signs are stable. His arms have been traumatized from the blood draws. He is afebrile. Blood pressure is stable at this point. His last blood pressure was 132/60. He is afebrile. Pulse is about 70 to 80. Neurologically he is unchanged. He requested a video swallow, which will be done today for him. Dr. Nazia Bellamy will see the patient on Monday. I believe this all is related to the trauma from pulling the vagus nerve away from the carotid artery during the carotid surgery and hopefully this should all resolve and just be temporary. Continue with the tube feedings at this time.       Dictated By Jessica Tamayo M.D.  d: 09/24/2022 10:52:45  t: 09/24/2022 11:53:30  Job [id number]  SOC/    cc: Jessica Tamayo M.D.

## 2022-09-25 LAB
CREAT BLD-MCNC: 0.75 MG/DL
GFR SERPLBLD BASED ON 1.73 SQ M-ARVRAT: 92 ML/MIN/1.73M2 (ref 60–?)
GLUCOSE BLD-MCNC: 104 MG/DL (ref 70–99)
GLUCOSE BLD-MCNC: 107 MG/DL (ref 70–99)
GLUCOSE BLD-MCNC: 110 MG/DL (ref 70–99)
GLUCOSE BLD-MCNC: 92 MG/DL (ref 70–99)
VANCOMYCIN TROUGH SERPL-MCNC: 12 UG/ML (ref 10–20)

## 2022-09-25 RX ORDER — MELATONIN
100 DAILY
Status: DISCONTINUED | OUTPATIENT
Start: 2022-09-25 | End: 2022-09-30

## 2022-09-25 NOTE — PROGRESS NOTES
PV Surgery Progress Note:  Feeling good today. Neuros intact, Left neck/right groin incisions C/D/I with some bruising noted. Palpable pulses. Reports minimal pain.

## 2022-09-25 NOTE — CONSULTS
[facility] Dosing Service    Follow-up Pharmacokinetic Consult for Vancomycin Dosing     Wes Ray is a 80year old patient who is being treated for pneumonia. Patient is on day 9 of vancomycin and is currently receiving 1250 mg Q 24 hours. Labs:  Lab Results   Component Value Date    CREATSERUM 0.75 09/25/2022      CrCl:  Estimated Creatinine Clearance: 74.7 mL/min (based on SCr of 0.75 mg/dL). Levels:  trough: 12 mcg/mL    Based on the above:    1. Continue Vancomycin at 1250 mg IVPB Q 24 hours based on pharmacokinetics and renal function. 2.  Will re-check vancomycin trough level(s) in 5-7 days. Goal trough is 10-15 mcg/mL unless otherwise noted by ordering provider. 3.  Pharmacy will order SCr as clinically indicated while on vancomycin to assess renal function. 4. Pharmacy will follow and monitor renal function, toxicity and efficacy. We appreciate the opportunity to assist in the care of this patient.     Kelsey Issa, [facility]  9/25/2022  3:13 PM  [address] [place] [address] Extension: [phone]

## 2022-09-25 NOTE — PLAN OF CARE
Received patient awake and oriented. On room air, breath sounds diminished with occasional productive cough and pt self suctions with a Yankauer. On tele, SR with frequent PVCs. Straight cathed x1 and bladder scanned x1. No c/o pain voiced. Maintained on contact isolation for MRSA in sputum.

## 2022-09-26 ENCOUNTER — APPOINTMENT (OUTPATIENT)
Dept: ULTRASOUND IMAGING | Facility: HOSPITAL | Age: 82
DRG: 853 | End: 2022-09-26
Attending: INTERNAL MEDICINE
Payer: MEDICARE

## 2022-09-26 LAB
CREAT BLD-MCNC: 0.81 MG/DL
GFR SERPLBLD BASED ON 1.73 SQ M-ARVRAT: 90 ML/MIN/1.73M2 (ref 60–?)
GLUCOSE BLD-MCNC: 106 MG/DL (ref 70–99)
GLUCOSE BLD-MCNC: 118 MG/DL (ref 70–99)
GLUCOSE BLD-MCNC: 123 MG/DL (ref 70–99)
GLUCOSE BLD-MCNC: 197 MG/DL (ref 70–99)

## 2022-09-26 PROCEDURE — 93971 EXTREMITY STUDY: CPT | Performed by: INTERNAL MEDICINE

## 2022-09-26 RX ORDER — DOXYCYCLINE HYCLATE 100 MG/1
100 CAPSULE ORAL EVERY 12 HOURS SCHEDULED
Status: DISCONTINUED | OUTPATIENT
Start: 2022-09-26 | End: 2022-09-26

## 2022-09-26 RX ORDER — DOXYCYCLINE HYCLATE 100 MG/1
100 CAPSULE ORAL EVERY 12 HOURS SCHEDULED
Status: COMPLETED | OUTPATIENT
Start: 2022-09-26 | End: 2022-09-29

## 2022-09-26 NOTE — H&P
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 has had marked improvement in voice and swallow new video swallow showed improved swallow currently being fed with NG tube    Nasal exam left NG tube oral exam normal palate normal tongue dry mucosa neck exam I can Aris bilaterally left carotid incision site healing well    Nasal pharyngeal scope    A flexible fiber optic scope was passed trans nasally down the left inferior  mediators into the hypopharynx level one secretions were encountered normal true vocal cord mobility was seen the previous paresis was resolved the right arytenoid had mild edema from pressure from feeding tube he performs were symmetric the squeeze was symmetric but overall weak      Assessment oral pharyngeal dysphasia continue speech therapy and tongue strengthening exercises  Vocal cord paresis resolved    Plan follow u p as outpatient three weeks sooner if swallow remains disabled

## 2022-09-26 NOTE — PROGRESS NOTES
Discussed with Dr. Day Precise- Video swallow improved > neuro at baseline- improved po intact.  CPM

## 2022-09-26 NOTE — PLAN OF CARE
Received care @ 1930  Contact precautions in place   A&Ox4  RA, NS on tele  Bladder scan Q6, straight cath 2x  L neck and R groin incision CDI  L arm skin tear dressing changed, CDI  Denies pain   Dobhoff intact, ENT to re-evaluate need  Tolerating diet  Denies pain   Updated on POC

## 2022-09-26 NOTE — PLAN OF CARE
Pt AxO3-4  2 L NC/RA  -wean as tolerated  -use IS every hour 10x  -yanker at bedside   =thin/white output  NSR  No pain at this time  Total assist/pivot  -up to chair  -PMR re-evaluated    HEENT  -nose   =dobhoff in place    ->TF on HOLD     =dc    ->dc per ENT  -mouth   =tolerated oral intake; cough spells  Wound  -L neck incision   =C/D/I  -R groin incision   =C/D/I    -bladder scan q6h   =straight cath 1x  IV antibiotics  -converted to PO  US RUE completed  Aspirate precautions  Monitor/manage BG level  Monitor/manage BP level

## 2022-09-27 LAB
ANION GAP SERPL CALC-SCNC: 5 MMOL/L (ref 0–18)
ANTIBODY SCREEN: NEGATIVE
BASOPHILS # BLD AUTO: 0.01 X10(3) UL (ref 0–0.2)
BASOPHILS NFR BLD AUTO: 0.2 %
BUN BLD-MCNC: 10 MG/DL (ref 7–18)
CALCIUM BLD-MCNC: 8.2 MG/DL (ref 8.5–10.1)
CHLORIDE SERPL-SCNC: 104 MMOL/L (ref 98–112)
CO2 SERPL-SCNC: 26 MMOL/L (ref 21–32)
CREAT BLD-MCNC: 0.81 MG/DL
CREAT BLD-MCNC: 0.81 MG/DL
EOSINOPHIL # BLD AUTO: 0.12 X10(3) UL (ref 0–0.7)
EOSINOPHIL NFR BLD AUTO: 2.2 %
ERYTHROCYTE [DISTWIDTH] IN BLOOD BY AUTOMATED COUNT: 15.1 %
GFR SERPLBLD BASED ON 1.73 SQ M-ARVRAT: 90 ML/MIN/1.73M2 (ref 60–?)
GFR SERPLBLD BASED ON 1.73 SQ M-ARVRAT: 90 ML/MIN/1.73M2 (ref 60–?)
GLUCOSE BLD-MCNC: 107 MG/DL (ref 70–99)
GLUCOSE BLD-MCNC: 113 MG/DL (ref 70–99)
GLUCOSE BLD-MCNC: 138 MG/DL (ref 70–99)
GLUCOSE BLD-MCNC: 211 MG/DL (ref 70–99)
GLUCOSE BLD-MCNC: 94 MG/DL (ref 70–99)
HCT VFR BLD AUTO: 20.9 %
HGB BLD-MCNC: 6.8 G/DL
HGB BLD-MCNC: 7.4 G/DL
IMM GRANULOCYTES # BLD AUTO: 0.03 X10(3) UL (ref 0–1)
IMM GRANULOCYTES NFR BLD: 0.6 %
LYMPHOCYTES # BLD AUTO: 0.76 X10(3) UL (ref 1–4)
LYMPHOCYTES NFR BLD AUTO: 14.2 %
MAGNESIUM SERPL-MCNC: 1.6 MG/DL (ref 1.6–2.6)
MCH RBC QN AUTO: 31.6 PG (ref 26–34)
MCHC RBC AUTO-ENTMCNC: 32.5 G/DL (ref 31–37)
MCV RBC AUTO: 97.2 FL
MONOCYTES # BLD AUTO: 0.46 X10(3) UL (ref 0.1–1)
MONOCYTES NFR BLD AUTO: 8.6 %
NEUTROPHILS # BLD AUTO: 3.96 X10 (3) UL (ref 1.5–7.7)
NEUTROPHILS # BLD AUTO: 3.96 X10(3) UL (ref 1.5–7.7)
NEUTROPHILS NFR BLD AUTO: 74.2 %
OSMOLALITY SERPL CALC.SUM OF ELEC: 279 MOSM/KG (ref 275–295)
PLATELET # BLD AUTO: 168 10(3)UL (ref 150–450)
POTASSIUM SERPL-SCNC: 3.1 MMOL/L (ref 3.5–5.1)
POTASSIUM SERPL-SCNC: 3.8 MMOL/L (ref 3.5–5.1)
RBC # BLD AUTO: 2.15 X10(6)UL
RH BLOOD TYPE: POSITIVE
SODIUM SERPL-SCNC: 135 MMOL/L (ref 136–145)
WBC # BLD AUTO: 5.3 X10(3) UL (ref 4–11)

## 2022-09-27 RX ORDER — POTASSIUM CHLORIDE 20 MEQ/1
40 TABLET, EXTENDED RELEASE ORAL EVERY 4 HOURS
Status: COMPLETED | OUTPATIENT
Start: 2022-09-27 | End: 2022-09-27

## 2022-09-27 RX ORDER — POTASSIUM CHLORIDE 20 MEQ/1
40 TABLET, EXTENDED RELEASE ORAL ONCE
Status: COMPLETED | OUTPATIENT
Start: 2022-09-27 | End: 2022-09-27

## 2022-09-27 RX ORDER — SODIUM CHLORIDE 9 MG/ML
INJECTION, SOLUTION INTRAVENOUS ONCE
Status: DISCONTINUED | OUTPATIENT
Start: 2022-09-27 | End: 2022-09-30

## 2022-09-27 RX ORDER — MAGNESIUM OXIDE 400 MG/1
400 TABLET ORAL ONCE
Status: COMPLETED | OUTPATIENT
Start: 2022-09-27 | End: 2022-09-27

## 2022-09-27 NOTE — PLAN OF CARE
Assumed care around 0730. AxO x4, no acute neuro changes, see flowsheets. Denies pain. NSR on tele, RA, 2L NC @ NOC. Yankauer at bedside for PRN use. IS/Flutter valve reinforced. Bladder scan Q6, straight cath x1. Up x2 with a pivot to the chair. Tolerating diet. Aspiration precautions in place and maintained. Hg 6.8, redraw 7.4. Per vascular hold 1 unit PRBC. Wife at bedside. Pt and wife updated on poc.    Pt needs met, call light within reach

## 2022-09-27 NOTE — CM/SW NOTE
Noted PMR recommendation is still for acute rehab. PMR note uploaded to [facility] referral. Silvana Gracia at [facility] and requested she initiate insurance auth. Dg Watts stated she will submit for insurance auth. Insurance [place] [employer] is pending. [place] will continue to follow. ELANA Serna  Discharge Planner     Addendum: [place] received call from pt's wife Neeru regarding discharge plan. [place] updated Peg MJ submitted for insurance [place] and [place] is pending. 1pm - [place] received message from Dg Watts at [facility] requesting updated PT/OT notes for insurance auth. PT is not signed into the treatment team, [place] messaged OT. OT stated they are seeing pt this afternoon and sent a message to PT. OT stated Arjun Gray from PT will see pt this afternoon as well.  Updated Apple at [facility].

## 2022-09-27 NOTE — CONSULTS
[facility]    PATIENT'S NAME: Tonny Boris   ATTENDING PHYSICIAN: Abelardo Paul MD   CONSULTING PHYSICIAN: Yves Brand M.D. PATIENT ACCOUNT#:   [de-identified]    LOCATION:  [address] [place] [address]  MEDICAL RECORD #:   [id number]       YOB: 1940  ADMISSION DATE:       09/01/2022      CONSULT DATE:  09/27/2022    REPORT OF CONSULTATION      FOLLOWUP CONSULTATION    Discussed and reviewed with Dr. France Ward. Read the notes from Dr. France Ward. Vocal cords now back to normal.  Reversed swelling from the surgery. Swallowing is back to normal.  I have asked the nurses to contact dietician about seeing if we can advance his diet. I agree with placement in rehab. His wounds are clean and dry. Neurologically, he is at his baseline. Instructed on wound care and activity.   If he does well eating, then it is okay from my point of view to let him go home or be discharged from the hospital.    Dictated By Yves Brand M.D.  d: 09/27/2022 11:23:46  t: 09/27/2022 12:34:24  [facility] [id number]  JJW/    cc: Yves Brand M.D.

## 2022-09-27 NOTE — PLAN OF CARE
Received care @ 1930  A&Ox4  2L NC, NS w/ 8 beats Tom Golden MD notified  Oswaldo Scriver @ bedside used as needed; IS and flutter valve encouraged  Bladder scan Q6; straight cath 2x  Updated on POC

## 2022-09-28 LAB
BASOPHILS # BLD AUTO: 0.02 X10(3) UL (ref 0–0.2)
BASOPHILS NFR BLD AUTO: 0.3 %
CREAT BLD-MCNC: 0.8 MG/DL
EOSINOPHIL # BLD AUTO: 0.16 X10(3) UL (ref 0–0.7)
EOSINOPHIL NFR BLD AUTO: 2.8 %
ERYTHROCYTE [DISTWIDTH] IN BLOOD BY AUTOMATED COUNT: 14.9 %
GFR SERPLBLD BASED ON 1.73 SQ M-ARVRAT: 90 ML/MIN/1.73M2 (ref 60–?)
GLUCOSE BLD-MCNC: 108 MG/DL (ref 70–99)
GLUCOSE BLD-MCNC: 113 MG/DL (ref 70–99)
GLUCOSE BLD-MCNC: 113 MG/DL (ref 70–99)
GLUCOSE BLD-MCNC: 134 MG/DL (ref 70–99)
HCT VFR BLD AUTO: 22 %
HGB BLD-MCNC: 7.1 G/DL
IMM GRANULOCYTES # BLD AUTO: 0.03 X10(3) UL (ref 0–1)
IMM GRANULOCYTES NFR BLD: 0.5 %
LYMPHOCYTES # BLD AUTO: 0.86 X10(3) UL (ref 1–4)
LYMPHOCYTES NFR BLD AUTO: 14.9 %
MAGNESIUM SERPL-MCNC: 1.7 MG/DL (ref 1.6–2.6)
MCH RBC QN AUTO: 31 PG (ref 26–34)
MCHC RBC AUTO-ENTMCNC: 32.3 G/DL (ref 31–37)
MCV RBC AUTO: 96.1 FL
MONOCYTES # BLD AUTO: 0.58 X10(3) UL (ref 0.1–1)
MONOCYTES NFR BLD AUTO: 10 %
NEUTROPHILS # BLD AUTO: 4.14 X10 (3) UL (ref 1.5–7.7)
NEUTROPHILS # BLD AUTO: 4.14 X10(3) UL (ref 1.5–7.7)
NEUTROPHILS NFR BLD AUTO: 71.5 %
PLATELET # BLD AUTO: 210 10(3)UL (ref 150–450)
POTASSIUM SERPL-SCNC: 4 MMOL/L (ref 3.5–5.1)
RBC # BLD AUTO: 2.29 X10(6)UL
WBC # BLD AUTO: 5.8 X10(3) UL (ref 4–11)

## 2022-09-28 RX ORDER — MAGNESIUM OXIDE 400 MG/1
400 TABLET ORAL ONCE
Status: COMPLETED | OUTPATIENT
Start: 2022-09-28 | End: 2022-09-28

## 2022-09-28 NOTE — CONSULTS
[facility]    PATIENT'S NAME: Darol Eisenmenger   ATTENDING PHYSICIAN: Fortino Luis MD   CONSULTING PHYSICIAN: Vinetta Schaumann, M.D. PATIENT ACCOUNT#:   [de-identified]    LOCATION:  [address] [place] [address]  MEDICAL RECORD #:   [id number]       YOB: 1940  ADMISSION DATE:       09/01/2022      CONSULT DATE:  09/28/2022    REPORT OF CONSULTATION    FOLLOWUP CONSULTATION    Patient overall doing well. Neurologically, he is intact. Wounds are clean and dry. He is tolerating diet. Repeat laryngoscopic evaluation shows normal vocal cord. Swallowing is adequate. He was instructed on wound care, activity, and followup.     Dictated By Vinetta Schaumann, M.D.  d: 09/28/2022 12:08:14  t: 09/28/2022 12:58:40  Job [id number]  [facility]/    cc: Vinetta Schaumann, M.D.

## 2022-09-28 NOTE — PLAN OF CARE
Patient informed that follow up neurology appointment is scheduled for 10/17/22 at 3:30 pm with Dr. Martinez Llamas. Informed him of the importance of keeping the appointment so there is no delay in follow up care. Per SW transportation can be provided from rehab facility to appointment if needed. Endorsed to [employer] so she can inform wife when she returns to bedside.

## 2022-09-28 NOTE — PLAN OF CARE
Received care @ 1930  A&Ox4  2L NC, NS on tele  Bcuk @ beside & used as needed    VSS  Bladder scan Q6; straight cath 2x  Awaiting insurance auth for MJ  Denies pain   Updated on POC

## 2022-09-28 NOTE — CM/SW NOTE
AUSTIN spoke with Kerline Mejias at [facility] regarding insurance [address]. Kerline Mejias stated insurance [address] is still pending. AUSTIN will continue to follow. Rahul Model, [facility]  Discharge Planner     Addendum: Notified by RN pt is being straight cath Q6 and would like to ensure [facility] is able to accommodate. AUSTIN spoke to Kerline Mejias at [facility] who stated they are able accommodate.

## 2022-09-29 ENCOUNTER — APPOINTMENT (OUTPATIENT)
Dept: GENERAL RADIOLOGY | Facility: HOSPITAL | Age: 82
DRG: 853 | End: 2022-09-29
Attending: HOSPITALIST
Payer: MEDICARE

## 2022-09-29 LAB
BASOPHILS # BLD AUTO: 0.02 X10(3) UL (ref 0–0.2)
BASOPHILS NFR BLD AUTO: 0.4 %
CREAT BLD-MCNC: 0.87 MG/DL
EOSINOPHIL # BLD AUTO: 0.19 X10(3) UL (ref 0–0.7)
EOSINOPHIL NFR BLD AUTO: 3.5 %
ERYTHROCYTE [DISTWIDTH] IN BLOOD BY AUTOMATED COUNT: 15.3 %
GFR SERPLBLD BASED ON 1.73 SQ M-ARVRAT: 88 ML/MIN/1.73M2 (ref 60–?)
GLUCOSE BLD-MCNC: 108 MG/DL (ref 70–99)
GLUCOSE BLD-MCNC: 114 MG/DL (ref 70–99)
GLUCOSE BLD-MCNC: 149 MG/DL (ref 70–99)
GLUCOSE BLD-MCNC: 89 MG/DL (ref 70–99)
HCT VFR BLD AUTO: 21.2 %
HGB BLD-MCNC: 6.6 G/DL
HGB BLD-MCNC: 7.2 G/DL
IMM GRANULOCYTES # BLD AUTO: 0.03 X10(3) UL (ref 0–1)
IMM GRANULOCYTES NFR BLD: 0.6 %
LYMPHOCYTES # BLD AUTO: 0.77 X10(3) UL (ref 1–4)
LYMPHOCYTES NFR BLD AUTO: 14.2 %
MAGNESIUM SERPL-MCNC: 1.5 MG/DL (ref 1.6–2.6)
MCH RBC QN AUTO: 30.8 PG (ref 26–34)
MCHC RBC AUTO-ENTMCNC: 31.1 G/DL (ref 31–37)
MCV RBC AUTO: 99.1 FL
MONOCYTES # BLD AUTO: 0.47 X10(3) UL (ref 0.1–1)
MONOCYTES NFR BLD AUTO: 8.7 %
NEUTROPHILS # BLD AUTO: 3.94 X10 (3) UL (ref 1.5–7.7)
NEUTROPHILS # BLD AUTO: 3.94 X10(3) UL (ref 1.5–7.7)
NEUTROPHILS NFR BLD AUTO: 72.6 %
PLATELET # BLD AUTO: 270 10(3)UL (ref 150–450)
RBC # BLD AUTO: 2.14 X10(6)UL
WBC # BLD AUTO: 5.4 X10(3) UL (ref 4–11)

## 2022-09-29 PROCEDURE — 74230 X-RAY XM SWLNG FUNCJ C+: CPT | Performed by: HOSPITALIST

## 2022-09-29 RX ORDER — MAGNESIUM OXIDE 400 MG/1
800 TABLET ORAL ONCE
Status: COMPLETED | OUTPATIENT
Start: 2022-09-29 | End: 2022-09-29

## 2022-09-29 NOTE — CM/SW NOTE
WILL spoke to Camille at [facility] to follow up on the status of insurance authorization. At this time, it is still pending. WILL/AUSTIN to remain available for discharge planning.     GURJIT De La CruzN, RN-BC    [id number]

## 2022-09-29 NOTE — PROGRESS NOTES
Doing well- tolerating diet- neurto may be slightly improving right arm- told to follow up with me in future.

## 2022-09-29 NOTE — CM/SW NOTE
SW updated wife that [facility] [employer] is still pending for Tariq Tolliver LCSW  /Discharge Planner

## 2022-09-29 NOTE — PLAN OF CARE
Assumed care 1900  Patient alert, oriented x4  Denies pain  Straight caths completed  Encouraged PO intake  Turned and repositioned

## 2022-09-30 VITALS
DIASTOLIC BLOOD PRESSURE: 45 MMHG | RESPIRATION RATE: 19 BRPM | BODY MASS INDEX: 23.84 KG/M2 | HEART RATE: 81 BPM | SYSTOLIC BLOOD PRESSURE: 106 MMHG | HEIGHT: 67 IN | WEIGHT: 151.88 LBS | TEMPERATURE: 98 F | OXYGEN SATURATION: 93 %

## 2022-09-30 LAB
BASOPHILS # BLD AUTO: 0.01 X10(3) UL (ref 0–0.2)
BASOPHILS NFR BLD AUTO: 0.2 %
BLOOD TYPE BARCODE: 5100
CREAT BLD-MCNC: 0.89 MG/DL
EOSINOPHIL # BLD AUTO: 0.2 X10(3) UL (ref 0–0.7)
EOSINOPHIL NFR BLD AUTO: 4.1 %
ERYTHROCYTE [DISTWIDTH] IN BLOOD BY AUTOMATED COUNT: 15.1 %
GFR SERPLBLD BASED ON 1.73 SQ M-ARVRAT: 87 ML/MIN/1.73M2 (ref 60–?)
GLUCOSE BLD-MCNC: 102 MG/DL (ref 70–99)
GLUCOSE BLD-MCNC: 202 MG/DL (ref 70–99)
HCT VFR BLD AUTO: 20.6 %
HGB BLD-MCNC: 6.7 G/DL
HGB BLD-MCNC: 7.1 G/DL
IMM GRANULOCYTES # BLD AUTO: 0.02 X10(3) UL (ref 0–1)
IMM GRANULOCYTES NFR BLD: 0.4 %
LYMPHOCYTES # BLD AUTO: 0.86 X10(3) UL (ref 1–4)
LYMPHOCYTES NFR BLD AUTO: 17.7 %
MAGNESIUM SERPL-MCNC: 1.4 MG/DL (ref 1.6–2.6)
MCH RBC QN AUTO: 31.5 PG (ref 26–34)
MCHC RBC AUTO-ENTMCNC: 32.5 G/DL (ref 31–37)
MCV RBC AUTO: 96.7 FL
MONOCYTES # BLD AUTO: 0.45 X10(3) UL (ref 0.1–1)
MONOCYTES NFR BLD AUTO: 9.3 %
NEUTROPHILS # BLD AUTO: 3.32 X10 (3) UL (ref 1.5–7.7)
NEUTROPHILS # BLD AUTO: 3.32 X10(3) UL (ref 1.5–7.7)
NEUTROPHILS NFR BLD AUTO: 68.3 %
PLATELET # BLD AUTO: 258 10(3)UL (ref 150–450)
RBC # BLD AUTO: 2.13 X10(6)UL
WBC # BLD AUTO: 4.9 X10(3) UL (ref 4–11)

## 2022-09-30 RX ORDER — AMLODIPINE BESYLATE 10 MG/1
10 TABLET ORAL DAILY
Qty: 30 TABLET | Refills: 0 | Status: SHIPPED | COMMUNITY
Start: 2022-10-01

## 2022-09-30 RX ORDER — METOPROLOL TARTRATE 50 MG/1
50 TABLET, FILM COATED ORAL
Qty: 60 TABLET | Refills: 0 | Status: SHIPPED | COMMUNITY
Start: 2022-09-30

## 2022-09-30 RX ORDER — FOLIC ACID 1 MG/1
1 TABLET ORAL DAILY
Qty: 30 TABLET | Refills: 0 | Status: SHIPPED | COMMUNITY
Start: 2022-10-01

## 2022-09-30 RX ORDER — GLYCOPYRROLATE 1 MG/1
1 TABLET ORAL 3 TIMES DAILY
Qty: 90 TABLET | Refills: 0 | Status: SHIPPED | COMMUNITY
Start: 2022-09-30

## 2022-09-30 RX ORDER — MAGNESIUM OXIDE 400 MG/1
800 TABLET ORAL ONCE
Status: COMPLETED | OUTPATIENT
Start: 2022-09-30 | End: 2022-09-30

## 2022-09-30 RX ORDER — POLYETHYLENE GLYCOL 3350 17 G/17G
17 POWDER, FOR SOLUTION ORAL DAILY PRN
Qty: 30 EACH | Refills: 0 | Status: SHIPPED | COMMUNITY
Start: 2022-09-30

## 2022-09-30 RX ORDER — ROSUVASTATIN CALCIUM 40 MG/1
40 TABLET, COATED ORAL NIGHTLY
Qty: 30 TABLET | Refills: 0 | Status: SHIPPED | COMMUNITY
Start: 2022-09-30

## 2022-09-30 RX ORDER — ACETAMINOPHEN 325 MG/1
650 TABLET ORAL EVERY 6 HOURS PRN
Qty: 30 TABLET | Refills: 0 | Status: SHIPPED | COMMUNITY
Start: 2022-09-30

## 2022-09-30 RX ORDER — LOSARTAN POTASSIUM 25 MG/1
25 TABLET ORAL DAILY
Qty: 30 TABLET | Refills: 0 | Status: SHIPPED | COMMUNITY
Start: 2022-10-01

## 2022-09-30 RX ORDER — AMITRIPTYLINE HYDROCHLORIDE 10 MG/1
10 TABLET, FILM COATED ORAL NIGHTLY PRN
Qty: 12 TABLET | Refills: 0 | Status: SHIPPED | OUTPATIENT
Start: 2022-09-30

## 2022-09-30 NOTE — CM/SW NOTE
Sw updated pt's wife that [facility] approved admission - waiting on clinical review by Sukhdeep Foster to approve transfer.     Cruz Luong, LCSW  /Discharge Planner

## 2022-09-30 NOTE — PLAN OF CARE
Assumed patient care at 1930  Patient is alert and orientated x 4; Cooperative with care  VSS; no oxygen needed at night  HR in the 80s; NSR with occasional PVC on the monitor  No pain reported  Patient has productive cough and uses Yankauer suction as needed at bedside  Urinary retention- Bladder scan every 6 hrs; straight cath times two this shift  Patient did well swallowing pills whole with applesauce  Hgb at last draw is 7.2 yesterday. Await labs this am  Awaiting insurance auth for [employer]  All safety precautions maintained  Will continue to monitor    Problem: SAFETY ADULT - FALL  Goal: Free from fall injury  Description: INTERVENTIONS:  - Assess pt frequently for physical needs  - Identify cognitive and physical deficits and behaviors that affect risk of falls.   - [place] fall precautions as indicated by assessment.  - Educate pt/family on patient safety including physical limitations  - Instruct pt to call for assistance with activity based on assessment  - Modify environment to reduce risk of injury  - Provide assistive devices as appropriate  - Consider OT/PT consult to assist with strengthening/mobility  - Encourage toileting schedule  Outcome: Progressing     Problem: Diabetes/Glucose Control  Goal: Glucose maintained within prescribed range  Description: INTERVENTIONS:  - Monitor Blood Glucose as ordered  - Assess for signs and symptoms of hyperglycemia and hypoglycemia  - Administer ordered medications to maintain glucose within target range  - Assess barriers to adequate nutritional intake and initiate nutrition consult as needed  - Instruct patient on self management of diabetes  Outcome: Progressing     Problem: NEUROLOGICAL - ADULT  Goal: Achieves stable or improved neurological status  Description: INTERVENTIONS  - Assess for and report changes in neurological status  - Initiate measures to prevent increased intracranial pressure  - Maintain blood pressure and fluid volume within ordered parameters to optimize cerebral perfusion and minimize risk of hemorrhage  - Monitor temperature, glucose, and sodium.  Initiate appropriate interventions as ordered  Outcome: Progressing     Problem: CARDIOVASCULAR - ADULT  Goal: Absence of cardiac arrhythmias or at baseline  Description: INTERVENTIONS:  - Continuous cardiac monitoring, monitor vital signs, obtain 12 lead EKG if indicated  - Evaluate effectiveness of antiarrhythmic and heart rate control medications as ordered  - Initiate emergency measures for life threatening arrhythmias  - Monitor electrolytes and administer replacement therapy as ordered  Outcome: Progressing

## 2022-09-30 NOTE — CM/SW NOTE
[facility] has auth for admission but pt's hgb today was 6.7 so physiatrist will be re-evaluating pt today.     Armen Jackson LCSW  /Discharge Planner

## 2022-09-30 NOTE — PLAN OF CARE
NURSING DISCHARGE NOTE    Discharged Rehab facility via Ambulance. Florecita English  Accompanied by  ambulance transport team  Belongings Taken by patient/family. Pt discharged to [facility] rehab at 9945. Phone report given to [address] RN at [phone]. All belongings sent with pt and/or taken home by wife. Wife present at time of discharge and aware of all plans. Problem: MUSCULOSKELETAL - ADULT  Goal: Return mobility to safest level of function  Description: INTERVENTIONS:  - Assess patient stability and activity tolerance for standing, transferring and ambulating w/ or w/o assistive devices  - Assist with transfers and ambulation using safe patient handling equipment as needed  - Ensure adequate protection for wounds/incisions during mobilization  - Obtain PT/OT consults as needed  - Advance activity as appropriate  - Communicate ordered activity level and limitations with patient/family  Outcome: Progressing     Problem: SAFETY ADULT - FALL  Goal: Free from fall injury  Description: INTERVENTIONS:  - Assess pt frequently for physical needs  - Identify cognitive and physical deficits and behaviors that affect risk of falls.   - [place] fall precautions as indicated by assessment.  - Educate pt/family on patient safety including physical limitations  - Instruct pt to call for assistance with activity based on assessment  - Modify environment to reduce risk of injury  - Provide assistive devices as appropriate  - Consider OT/PT consult to assist with strengthening/mobility  - Encourage toileting schedule  Outcome: Adequate for Discharge     Problem: CARDIOVASCULAR - ADULT  Goal: Absence of cardiac arrhythmias or at baseline  Description: INTERVENTIONS:  - Continuous cardiac monitoring, monitor vital signs, obtain 12 lead EKG if indicated  - Evaluate effectiveness of antiarrhythmic and heart rate control medications as ordered  - Initiate emergency measures for life threatening arrhythmias  - Monitor electrolytes and administer replacement therapy as ordered  Outcome: Adequate for Discharge     Problem: METABOLIC/FLUID AND ELECTROLYTES - ADULT  Goal: Electrolytes maintained within normal limits  Description: INTERVENTIONS:  - Monitor labs and rhythm and assess patient for signs and symptoms of electrolyte imbalances  - Administer electrolyte replacement as ordered  - Monitor response to electrolyte replacements, including rhythm and repeat lab results as appropriate  - Fluid restriction as ordered  - Instruct patient on fluid and nutrition restrictions as appropriate  Outcome: Adequate for Discharge  Goal: Hemodynamic stability and optimal renal function maintained  Description: INTERVENTIONS:  - Monitor labs and assess for signs and symptoms of volume excess or deficit  - Monitor intake, output and patient weight  - Monitor urine specific gravity, serum osmolarity and serum sodium as indicated or ordered  - Monitor response to interventions for patient's volume status, including labs, urine output, blood pressure (other measures as available)  - Encourage oral intake as appropriate  - Instruct patient on fluid and nutrition restrictions as appropriate  Outcome: Adequate for Discharge     Problem: SKIN/TISSUE INTEGRITY - ADULT  Goal: Skin integrity remains intact  Description: INTERVENTIONS  - Assess and document risk factors for pressure ulcer development  - Assess and document skin integrity  - Monitor for areas of redness and/or skin breakdown  - Initiate interventions, skin care algorithm/standards of care as needed  Outcome: Adequate for Discharge     Problem: NEUROLOGICAL - ADULT  Goal: Achieves stable or improved neurological status  Description: INTERVENTIONS  - Assess for and report changes in neurological status  - Initiate measures to prevent increased intracranial pressure  - Maintain blood pressure and fluid volume within ordered parameters to optimize cerebral perfusion and minimize risk of hemorrhage  - Monitor temperature, glucose, and sodium.  Initiate appropriate interventions as ordered  Outcome: Adequate for Discharge     Problem: Diabetes/Glucose Control  Goal: Glucose maintained within prescribed range  Description: INTERVENTIONS:  - Monitor Blood Glucose as ordered  - Assess for signs and symptoms of hyperglycemia and hypoglycemia  - Administer ordered medications to maintain glucose within target range  - Assess barriers to adequate nutritional intake and initiate nutrition consult as needed  - Instruct patient on self management of diabetes  Outcome: Adequate for Discharge

## 2022-09-30 NOTE — CM/SW NOTE
09/30/22 1300   Discharge disposition   Expected discharge disposition Rehab [address] Provider [facility]   Discharge transportation [facility]     [facility] [facility] can accept today. Pt will be going to room 1113. RN to call report to [phone]. [facility] arranged for 4pm dc. RN aware of need to call report. Pt and wife updated.     Robert Neal LCSW  /Discharge Planner

## 2022-11-29 ENCOUNTER — ORDER TRANSCRIPTION (OUTPATIENT)
Dept: ADMINISTRATIVE | Facility: HOSPITAL | Age: 82
End: 2022-11-29

## 2022-11-29 DIAGNOSIS — Z11.59 ENCOUNTER FOR SCREENING FOR OTHER VIRAL DISEASES: ICD-10-CM

## 2022-11-29 DIAGNOSIS — Z01.818 PREOP EXAMINATION: Primary | ICD-10-CM

## 2022-12-05 ENCOUNTER — LAB ENCOUNTER (OUTPATIENT)
Dept: LAB | Age: 82
End: 2022-12-05
Attending: FAMILY MEDICINE
Payer: MEDICARE

## 2022-12-05 DIAGNOSIS — Z01.818 PREOP EXAMINATION: ICD-10-CM

## 2022-12-05 DIAGNOSIS — Z11.59 ENCOUNTER FOR SCREENING FOR OTHER VIRAL DISEASES: ICD-10-CM

## 2022-12-06 LAB — SARS-COV-2 RNA RESP QL NAA+PROBE: NOT DETECTED

## 2022-12-07 RX ORDER — FAMOTIDINE 40 MG/1
40 TABLET, FILM COATED ORAL DAILY
COMMUNITY

## 2022-12-07 RX ORDER — FLUTICASONE FUROATE, UMECLIDINIUM BROMIDE AND VILANTEROL TRIFENATATE 200; 62.5; 25 UG/1; UG/1; UG/1
1 POWDER RESPIRATORY (INHALATION) DAILY
COMMUNITY

## 2022-12-07 RX ORDER — MULTIVITAMIN
1 TABLET ORAL DAILY
COMMUNITY

## 2022-12-08 ENCOUNTER — NURSE ONLY (OUTPATIENT)
Dept: LAB | Facility: HOSPITAL | Age: 82
End: 2022-12-08
Attending: FAMILY MEDICINE
Payer: MEDICARE

## 2022-12-08 ENCOUNTER — HOSPITAL ENCOUNTER (OUTPATIENT)
Dept: GENERAL RADIOLOGY | Facility: HOSPITAL | Age: 82
Discharge: HOME | End: 2022-12-08
Attending: RADIOLOGY
Payer: MEDICARE

## 2022-12-08 ENCOUNTER — HOSPITAL ENCOUNTER (OUTPATIENT)
Dept: CT IMAGING | Facility: HOSPITAL | Age: 82
Discharge: HOME | End: 2022-12-08
Attending: FAMILY MEDICINE
Payer: MEDICARE

## 2022-12-08 VITALS
OXYGEN SATURATION: 99 % | DIASTOLIC BLOOD PRESSURE: 57 MMHG | HEIGHT: 66 IN | WEIGHT: 142 LBS | TEMPERATURE: 97 F | RESPIRATION RATE: 22 BRPM | BODY MASS INDEX: 22.82 KG/M2 | HEART RATE: 62 BPM | SYSTOLIC BLOOD PRESSURE: 142 MMHG

## 2022-12-08 DIAGNOSIS — R94.2 ABNORMAL PET SCAN OF LUNG: ICD-10-CM

## 2022-12-08 DIAGNOSIS — J94.8 PLEURAL MASS: Primary | ICD-10-CM

## 2022-12-08 PROCEDURE — 32408 CORE NDL BX LNG/MED PERQ: CPT | Performed by: FAMILY MEDICINE

## 2022-12-08 PROCEDURE — 88305 TISSUE EXAM BY PATHOLOGIST: CPT | Performed by: FAMILY MEDICINE

## 2022-12-08 PROCEDURE — 71045 X-RAY EXAM CHEST 1 VIEW: CPT | Performed by: RADIOLOGY

## 2022-12-08 PROCEDURE — 99152 MOD SED SAME PHYS/QHP 5/>YRS: CPT | Performed by: FAMILY MEDICINE

## 2022-12-08 PROCEDURE — 88342 IMHCHEM/IMCYTCHM 1ST ANTB: CPT | Performed by: FAMILY MEDICINE

## 2022-12-08 PROCEDURE — 88341 IMHCHEM/IMCYTCHM EA ADD ANTB: CPT | Performed by: FAMILY MEDICINE

## 2022-12-08 RX ORDER — SODIUM CHLORIDE 9 MG/ML
INJECTION, SOLUTION INTRAVENOUS CONTINUOUS
Status: DISCONTINUED | OUTPATIENT
Start: 2022-12-08 | End: 2022-12-10

## 2022-12-08 RX ORDER — FLUMAZENIL 0.1 MG/ML
0.2 INJECTION INTRAVENOUS AS NEEDED
Status: DISCONTINUED | OUTPATIENT
Start: 2022-12-08 | End: 2022-12-10

## 2022-12-08 RX ORDER — MIDAZOLAM HYDROCHLORIDE 1 MG/ML
INJECTION INTRAMUSCULAR; INTRAVENOUS
Status: COMPLETED
Start: 2022-12-08 | End: 2022-12-08

## 2022-12-08 RX ORDER — MIDAZOLAM HYDROCHLORIDE 1 MG/ML
4 INJECTION INTRAMUSCULAR; INTRAVENOUS EVERY 5 MIN PRN
Status: DISCONTINUED | OUTPATIENT
Start: 2022-12-08 | End: 2022-12-08

## 2022-12-08 RX ORDER — NALOXONE HYDROCHLORIDE 0.4 MG/ML
80 INJECTION, SOLUTION INTRAMUSCULAR; INTRAVENOUS; SUBCUTANEOUS AS NEEDED
Status: DISCONTINUED | OUTPATIENT
Start: 2022-12-08 | End: 2022-12-10

## 2022-12-08 RX ADMIN — MIDAZOLAM HYDROCHLORIDE 0.5 MG: 1 INJECTION INTRAMUSCULAR; INTRAVENOUS at 09:02:00

## 2022-12-08 RX ADMIN — MIDAZOLAM HYDROCHLORIDE 1 MG: 1 INJECTION INTRAMUSCULAR; INTRAVENOUS at 08:59:00

## 2022-12-08 RX ADMIN — MIDAZOLAM HYDROCHLORIDE 0.5 MG: 1 INJECTION INTRAMUSCULAR; INTRAVENOUS at 09:04:00

## 2022-12-08 RX ADMIN — SODIUM CHLORIDE: 9 INJECTION, SOLUTION INTRAVENOUS at 08:49:00

## 2022-12-08 NOTE — IMAGING NOTE
Pt post left lung biopsy. Tolerated procedure and sedation well. Denies any pain. Biospy site left back CDI with tegaderm. Drowsy, but easily arousable and oriented. Report called to [facility] in [facility] and bed assigned.

## 2022-12-08 NOTE — DISCHARGE INSTRUCTIONS
[facility] Department of Radiology    Biopsy of any type      Have someone drive you home after your procedure. You may not drive yourself home    [address]    Take things easy for the rest of the day after your biopsy. You may be sore in the biopsy area for one to five days  DO drink plenty of fluids  DO resume your regular diet  DO keep a bandage on the biopsy site for at least 24 hours  DO NOT take a hot bath or shower for at least 12 hours  DO NOT drive or operative machinery for at least 24 hours  DO NOT smoke for at least 24 hours  DO NOT do any strenuous exercise or lifting for at least 48 hours  DO call your doctor immediately if  You start bleeding  There is any change in color or temperature of the area where the biopsy was performed  You develop increasing pain in shortness of breath    Ok to resume your plavix tomorrow, December 9th.

## 2023-01-01 ENCOUNTER — TELEPHONE (OUTPATIENT)
Dept: HEMATOLOGY/ONCOLOGY | Facility: HOSPITAL | Age: 83
End: 2023-01-01

## 2023-01-01 ENCOUNTER — LAB ENCOUNTER (OUTPATIENT)
Dept: LAB | Facility: HOSPITAL | Age: 83
End: 2023-01-01
Attending: FAMILY MEDICINE
Payer: MEDICARE

## 2023-01-01 DIAGNOSIS — C34.92 SARCOMATOID CARCINOMA OF LUNG, LEFT (HCC): Primary | ICD-10-CM

## 2023-01-04 ENCOUNTER — OFFICE VISIT (OUTPATIENT)
Dept: HEMATOLOGY/ONCOLOGY | Facility: HOSPITAL | Age: 83
End: 2023-01-04
Attending: INTERNAL MEDICINE
Payer: MEDICARE

## 2023-01-04 VITALS
HEART RATE: 82 BPM | SYSTOLIC BLOOD PRESSURE: 160 MMHG | BODY MASS INDEX: 23 KG/M2 | WEIGHT: 144 LBS | TEMPERATURE: 98 F | RESPIRATION RATE: 20 BRPM | DIASTOLIC BLOOD PRESSURE: 81 MMHG | OXYGEN SATURATION: 99 %

## 2023-01-04 DIAGNOSIS — D64.9 NORMOCYTIC ANEMIA: ICD-10-CM

## 2023-01-04 DIAGNOSIS — Z87.891 FORMER SMOKER: ICD-10-CM

## 2023-01-04 DIAGNOSIS — F10.11 H/O ETOH ABUSE: ICD-10-CM

## 2023-01-04 DIAGNOSIS — I63.9 CEREBROVASCULAR ACCIDENT (CVA), UNSPECIFIED MECHANISM (HCC): ICD-10-CM

## 2023-01-04 DIAGNOSIS — C34.92 SARCOMATOID CARCINOMA OF LUNG, LEFT (HCC): Primary | ICD-10-CM

## 2023-01-04 PROCEDURE — 99205 OFFICE O/P NEW HI 60 MIN: CPT | Performed by: INTERNAL MEDICINE

## 2023-01-04 NOTE — PATIENT INSTRUCTIONS
For triage nurse: [phone] Monday through Friday 7:30-5:00.  *Please note this is a new phone number*    After hours or weekends for emergent needs:  [phone].      To schedule diagnostic testing: Central [phone]    For Medical Records: [phone]

## 2023-01-05 ENCOUNTER — TELEPHONE (OUTPATIENT)
Dept: HEMATOLOGY/ONCOLOGY | Facility: HOSPITAL | Age: 83
End: 2023-01-05

## 2023-01-05 ENCOUNTER — NURSE ONLY (OUTPATIENT)
Dept: HEMATOLOGY/ONCOLOGY | Facility: HOSPITAL | Age: 83
End: 2023-01-05
Attending: INTERNAL MEDICINE
Payer: MEDICARE

## 2023-01-05 ENCOUNTER — NURSE NAVIGATOR ENCOUNTER (OUTPATIENT)
Dept: HEMATOLOGY/ONCOLOGY | Facility: HOSPITAL | Age: 83
End: 2023-01-05

## 2023-01-05 DIAGNOSIS — D64.9 NORMOCYTIC ANEMIA: ICD-10-CM

## 2023-01-05 DIAGNOSIS — C34.92 SARCOMATOID CARCINOMA OF LUNG, LEFT (HCC): Primary | ICD-10-CM

## 2023-01-05 DIAGNOSIS — C34.92 SARCOMATOID CARCINOMA OF LUNG, LEFT (HCC): ICD-10-CM

## 2023-01-05 LAB
ALBUMIN SERPL-MCNC: 2.6 G/DL (ref 3.4–5)
ALBUMIN/GLOB SERPL: 0.6 {RATIO} (ref 1–2)
ALP LIVER SERPL-CCNC: 224 U/L
ALT SERPL-CCNC: 55 U/L
ANION GAP SERPL CALC-SCNC: 7 MMOL/L (ref 0–18)
AST SERPL-CCNC: 22 U/L (ref 15–37)
BASOPHILS # BLD AUTO: 0.02 X10(3) UL (ref 0–0.2)
BASOPHILS NFR BLD AUTO: 0.4 %
BILIRUB SERPL-MCNC: 0.7 MG/DL (ref 0.1–2)
BUN BLD-MCNC: 18 MG/DL (ref 7–18)
CALCIUM BLD-MCNC: 8.9 MG/DL (ref 8.5–10.1)
CEA SERPL-MCNC: 1.7 NG/ML (ref ?–5)
CHLORIDE SERPL-SCNC: 106 MMOL/L (ref 98–112)
CO2 SERPL-SCNC: 25 MMOL/L (ref 21–32)
CREAT BLD-MCNC: 1.08 MG/DL
EOSINOPHIL # BLD AUTO: 0.14 X10(3) UL (ref 0–0.7)
EOSINOPHIL NFR BLD AUTO: 3 %
ERYTHROCYTE [DISTWIDTH] IN BLOOD BY AUTOMATED COUNT: 15 %
GFR SERPLBLD BASED ON 1.73 SQ M-ARVRAT: 69 ML/MIN/1.73M2 (ref 60–?)
GLOBULIN PLAS-MCNC: 4.1 G/DL (ref 2.8–4.4)
GLUCOSE BLD-MCNC: 184 MG/DL (ref 70–99)
HCT VFR BLD AUTO: 33.4 %
HGB BLD-MCNC: 10.3 G/DL
IMM GRANULOCYTES # BLD AUTO: 0.02 X10(3) UL (ref 0–1)
IMM GRANULOCYTES NFR BLD: 0.4 %
LDH SERPL L TO P-CCNC: 74 U/L
LYMPHOCYTES # BLD AUTO: 1.15 X10(3) UL (ref 1–4)
LYMPHOCYTES NFR BLD AUTO: 24.3 %
MAGNESIUM SERPL-MCNC: 1.9 MG/DL (ref 1.6–2.6)
MCH RBC QN AUTO: 25.8 PG (ref 26–34)
MCHC RBC AUTO-ENTMCNC: 30.8 G/DL (ref 31–37)
MCV RBC AUTO: 83.5 FL
MONOCYTES # BLD AUTO: 0.35 X10(3) UL (ref 0.1–1)
MONOCYTES NFR BLD AUTO: 7.4 %
NEUTROPHILS # BLD AUTO: 3.05 X10 (3) UL (ref 1.5–7.7)
NEUTROPHILS # BLD AUTO: 3.05 X10(3) UL (ref 1.5–7.7)
NEUTROPHILS NFR BLD AUTO: 64.5 %
OSMOLALITY SERPL CALC.SUM OF ELEC: 293 MOSM/KG (ref 275–295)
PLATELET # BLD AUTO: 244 10(3)UL (ref 150–450)
POTASSIUM SERPL-SCNC: 3.3 MMOL/L (ref 3.5–5.1)
PROT SERPL-MCNC: 6.7 G/DL (ref 6.4–8.2)
RBC # BLD AUTO: 4 X10(6)UL
SODIUM SERPL-SCNC: 138 MMOL/L (ref 136–145)
WBC # BLD AUTO: 4.7 X10(3) UL (ref 4–11)

## 2023-01-05 PROCEDURE — 80053 COMPREHEN METABOLIC PANEL: CPT

## 2023-01-05 PROCEDURE — 82378 CARCINOEMBRYONIC ANTIGEN: CPT

## 2023-01-05 PROCEDURE — 36415 COLL VENOUS BLD VENIPUNCTURE: CPT

## 2023-01-05 PROCEDURE — 83735 ASSAY OF MAGNESIUM: CPT

## 2023-01-05 PROCEDURE — 86255 FLUORESCENT ANTIBODY SCREEN: CPT

## 2023-01-05 PROCEDURE — 83615 LACTATE (LD) (LDH) ENZYME: CPT

## 2023-01-05 PROCEDURE — 85045 AUTOMATED RETICULOCYTE COUNT: CPT

## 2023-01-05 PROCEDURE — 85025 COMPLETE CBC W/AUTO DIFF WBC: CPT

## 2023-01-05 PROCEDURE — 84238 ASSAY NONENDOCRINE RECEPTOR: CPT

## 2023-01-05 NOTE — TELEPHONE ENCOUNTER
Discussed MQ mychart message with wife, verbalized understanding. Labs appt today, wife with scheduling imaging.

## 2023-01-05 NOTE — TELEPHONE ENCOUNTER
Patient's wife called. They have decided to start treatment under the care of Dr Boris Montes. Please call Peg with further instructions. Thank you.

## 2023-01-05 NOTE — TELEPHONE ENCOUNTER
Neeru called to ask Dr Tray Perez if she can add two lab tests to [facility] lab order for today. Basil's PCP at [address] [place] [address] asked Basil to get an electrolyte panel and magnesium lever drawn today. Neeru would like to avoid making two trips for labs. I told Neeru I would forward her question to Dr Tray Perez and her nurse. Neeru is asking for a call back at [phone].

## 2023-01-05 NOTE — PROGRESS NOTES
Next generation genomic sequencing order sent in online to [facility] for tumor only testing. Specimen [id number] collected 12/08/2022 to be tested; additional testing requested: PDL1.

## 2023-01-06 ENCOUNTER — TELEPHONE (OUTPATIENT)
Dept: HEMATOLOGY/ONCOLOGY | Facility: HOSPITAL | Age: 83
End: 2023-01-06

## 2023-01-06 DIAGNOSIS — C34.92 SARCOMATOID CARCINOMA OF LUNG, LEFT (HCC): Primary | ICD-10-CM

## 2023-01-06 PROBLEM — C45.0: Status: ACTIVE | Noted: 2023-01-01

## 2023-01-06 PROBLEM — C77.1: Status: ACTIVE | Noted: 2023-01-06

## 2023-01-06 PROBLEM — C77.1: Status: ACTIVE | Noted: 2023-01-01

## 2023-01-06 PROBLEM — C45.0: Status: ACTIVE | Noted: 2023-01-06

## 2023-01-06 LAB
HGB RETIC QN AUTO: 26 PG (ref 28.2–36.6)
IMM RETICS NFR: 0.07 RATIO (ref 0.1–0.3)
RETICS # AUTO: 28 X10(3) UL (ref 22.5–147.5)
RETICS/RBC NFR AUTO: 0.7 %

## 2023-01-06 NOTE — TELEPHONE ENCOUNTER
Received phone call from [facility] [employer] in Pathology. They received order for [employer] as well as PDL but only have one block. Wondering which to prioritize.  Please return her call at [phone]

## 2023-01-08 LAB — SOLUBLE TRANSFERRIN RECEPTOR: 3.5 MG/L

## 2023-01-13 ENCOUNTER — TELEPHONE (OUTPATIENT)
Dept: HEMATOLOGY/ONCOLOGY | Facility: HOSPITAL | Age: 83
End: 2023-01-13

## 2023-01-13 NOTE — TELEPHONE ENCOUNTER
Cl Graham called patient  2 days ago. She need records sent to [facility] [place]; fax # [phone]. They want medical records to do visual vs autopsy. Please send asap.

## 2023-01-18 ENCOUNTER — APPOINTMENT (OUTPATIENT)
Dept: HEMATOLOGY/ONCOLOGY | Facility: HOSPITAL | Age: 83
End: 2023-01-18
Attending: INTERNAL MEDICINE
Payer: MEDICARE

## (undated) DEVICE — TOWEL SURG OR 17X30IN BLUE

## (undated) DEVICE — UNDYED BRAIDED (POLYGLACTIN 910), SYNTHETIC ABSORBABLE SUTURE: Brand: COATED VICRYL

## (undated) DEVICE — TRAY SURESTEP 16 BARDEX UMETR

## (undated) DEVICE — 3M™ STERI-STRIP™ REINFORCED ADHESIVE SKIN CLOSURES, R1547, 1/2 IN X 4 IN (12 MM X 100 MM), 6 STRIPS/ENVELOPE: Brand: 3M™ STERI-STRIP™

## (undated) DEVICE — STANDARD HYPODERMIC NEEDLE,POLYPROPYLENE HUB: Brand: MONOJECT

## (undated) DEVICE — SUT PROLENE 7-0 BV-1 8702H

## (undated) DEVICE — GAUZE SPONGES,12 PLY: Brand: CURITY

## (undated) DEVICE — TRANSPOSAL ULTRAFLEX DUO/QUAD ULTRA CART MANIFOLD

## (undated) DEVICE — 3M™ IOBAN™ 2 ANTIMICROBIAL INCISE DRAPE 6650EZ: Brand: IOBAN™ 2

## (undated) DEVICE — SUT POLYDEK 4-0 TIES 6-932

## (undated) DEVICE — CATH RED RUBBER 20FR

## (undated) DEVICE — STERILE POLYISOPRENE POWDER-FREE SURGICAL GLOVES: Brand: PROTEXIS

## (undated) DEVICE — SUT VICRYL 2-0 CT-1 J945H

## (undated) DEVICE — SUT VICRYL 3-0 PS-1 J936H

## (undated) DEVICE — APPLICATOR CHLORAPREP 26ML

## (undated) DEVICE — SOLUTION  .9 1000ML BTL

## (undated) DEVICE — SUT PROLENE 6-0 C-1 M8726

## (undated) DEVICE — SKIN MARKER DUAL TIP WITH RULER CAP AND LABELS: Brand: DEVON

## (undated) DEVICE — CLOSURE EXOFIN 1.0ML

## (undated) DEVICE — CLIP INTERNAL HORIZON MED

## (undated) DEVICE — SUNDT™ EXTERNAL CAROTID ENDARTERECTOMY SHUNT
Type: IMPLANTABLE DEVICE | Status: NON-FUNCTIONAL
Brand: SUNDT™
Removed: 2022-09-14

## (undated) DEVICE — SLEEVE KENDALL SCD EXPRESS MED

## (undated) DEVICE — SOLUTION  .9 500ML

## (undated) DEVICE — FLOSEAL HEMOSTATIC MATRIX, 5ML: Brand: FLOSEAL HEMOSTATIC MATRIX

## (undated) DEVICE — SYRINGE 10ML LL TIP

## (undated) DEVICE — 3M™ TEGADERM™ TRANSPARENT FILM DRESSING, 1626W, 4 IN X 4-3/4 IN (10 CM X 12 CM), 50 EACH/CARTON, 4 CARTON/CASE: Brand: 3M™ TEGADERM™

## (undated) DEVICE — 3M™ BAIR HUGGER® UNDERBODY BLANKET, FULL ACCESS, 10 PER CASE 63500: Brand: BAIR HUGGER™

## (undated) DEVICE — GEL AQUASONIC 100 20GR

## (undated) DEVICE — CV PACK-LF: Brand: MEDLINE INDUSTRIES, INC.

## (undated) DEVICE — INTENDED TO BE USED TO OCCLUDE, RETRACT AND IDENTIFY ARTERIES, VEINS, TENDONS AND NERVES IN SURGICAL PROCEDURES: Brand: STERION®  VESSEL LOOP

## (undated) DEVICE — SUT ETHIBOND 3-0 SH X832H

## (undated) NOTE — LETTER
Date & Time: 3/19/2021, 5:36 PM  Patient: Della Notch  Encounter Provider(s):    Frosty Eisenmenger, MD         This certifies that Joseph Zapata, a patient at an [facility], am leaving the facility voluntarily and aga

## (undated) NOTE — ED AVS SNAPSHOT
Parvez Rogers   MRN: [id number]    Department:  [facility] Emergency Department   Date of Visit:  8/8/2019           Disclosure     Insurance plans vary and the physician(s) referred by the ER may not be covered by your plan.  Please contact yo tell this physician (or your personal doctor if your instructions are to return to your personal doctor) about any new or lasting problems. The primary care or specialist physician will see patients referred from the [facility] Emergency Department.  Kristian Barbosa

## (undated) NOTE — IP AVS SNAPSHOT
[address]            (For Outpatient Use Only) Initial Admit Date: 2022   Inpt/Obs Admit Date: Inpt: 9/3/22 / Obs: 22   Discharge Date:    Rahat Leak:  [de-identified]   MRN: [de-identified]   CSN: [id number]   CEID: [id number]        ENCOUNTER  Patient Class: Inpatient Admitting Provider: Fifi Craft MD Unit: [address] [place] [address] Service: Cardiac Telemetry Attending Provider: Stone Coreas DO   Bed: [facility]   Visit Type:   Referring Physician: No ref. provider found Billing Flag:    Admit Diagnosis: Hypokalemia [E87.6]      PATIENT  Legal Name:   Bienvenido Zhong   Legal Sex: Male  Gender ID: Male             [facility] Floor Name:   [address] [place] [address] PCP:  Danny Moran MD Home: [phone]   Address:  [address] [place] [address] : 1940 (81 yrs) Mobile: [phone]         City/State/Zip: [place], [address] Marital:  Language: Ambreen cooper: Pete SSN4: [id number] Buddhism: Wishes Privacy     Race: White Ethnicity: Non  Or [address] [place] [address]   Name Relationship Legal Guardian? Home Phone Work Phone Mobile Phone   1. Neeru Hung  2.  Charl Fore Spouse  Daughter      [phone]     GUARANTOR  Guarantor: Maria A Sauceda : 1940 Home Phone: [phone]   Address: [address] [place] [address]  Sex: Male Work Phone:    City/State/Zip: [place]   Rel. to Patient: Self Guarantor ID: [id number]   [address]   Employer:  Status: RETIRED     COVERAGE  PRIMARY INSURANCE   Payor: ROLAN JASMINE Plan: [place]   Group Number: [id number] Insurance Type: [id number] Name: Cassie Winkler : 1940   Subscriber ID: [id number] Pt Rel to Subscriber: Self   SECONDARY INSURANCE   Payor:  Plan:    Group Number:  Insurance Type:    Subscriber Name:  Subscriber :    Subscriber ID:  Pt Rel to Subscriber:    TERTIARY INSURANCE   Payor:  Plan:    Group Number: Insurance Type:    Subscriber Name:  Subscriber :    Subscriber ID:  Pt Rel to Subscriber:    Hospital Account Financial Class: Medicare Advantage    2022

## (undated) NOTE — LETTER
[facility] FOR BLOOD OR BLOOD COMPONENTS      In the course of your treatment, it may become necessary to administer a transfusion of blood or blood components. This form provides basic information concerning this procedure and, if signed by you, authorizes its performance by qualified medical personnel. DESCRIPTION OF PROCEDURE:  Blood is introduced into one of your veins, commonly in the arm, using a sterilized disposable needle. The amount of blood transfused, and whether the transfusion will be of blood or blood components is a judgment the physician will make based on your particular needs. RISKS:  The transfusion is a common procedure of low risk. MINOR AND TEMPORARY REACTIONS ARE NOT UNCOMMON, including a slight bruise, swelling or local reaction in the area where the needle pierces your skin, or a non-serious reaction to the transfused material itself, including headache, fever or a mild skin reaction, such as rash. Serious reactions are possible, though very unlikely and include severe allergic reaction (shock)  and destruction (hemolysis) of transfused blood cells. Infectious diseases which are known to be transmitted by blood transfusion include CERTAIN TYPES OF VIRAL HEPATITIS, a viral infection of the liver, HUMAN IMMUNODEFICIENCY VIRUS (HIV-1,2) infection, a viral infection known to cause ACQUIRED IMMUNODEFICIENCY SYNDROME (AIDS) AS WELL AS CERTAIN OTHER BACTERIAL, VIRAL AND PARASITIC DISEASES. While a minimal risk of acquiring an infectious disease from transfused blood exists, in accordance with Federal and State law all due care has been taken in donor selection and testing to avoid transmission of disease. ALTERNATIVES:  If loss of blood poses serious threats in the course of your treatment, THERE IS NO EFFECTIVE ALTERNATIVE TO BLOOD TRANSFUSION.  However, if you have any further questions on this matter, your physician will fully explain the alternatives to you if it has not already been done. I,Herbert Hung, have read/had read to me the above. I understand the matters bearing on the decision whether or not to authorize a transfusion of blood or blood components. I have no questions which have not been answered to my full satisfaction.  I hereby consent to such transfusion as  my physician may deem necessary or advisable in the course of my treatment.        _______________   __________________________________________________  Date     Signature of Patient, Parent or Legal Guardian      ([employer])      __________________________________________  Witness to Signature (title or relationship to patient)    Patient Name: Giuseppe Lawson     : 1940                 Printed: 2022     Medical Record #: [id number]                    Page 1 of 1